# Patient Record
Sex: MALE | Race: OTHER | NOT HISPANIC OR LATINO | URBAN - METROPOLITAN AREA
[De-identification: names, ages, dates, MRNs, and addresses within clinical notes are randomized per-mention and may not be internally consistent; named-entity substitution may affect disease eponyms.]

---

## 2020-10-20 RX ORDER — AZTREONAM 2 G
1 VIAL (EA) INJECTION
Qty: 0 | Refills: 0 | DISCHARGE
Start: 2020-10-20 | End: 2020-10-27

## 2020-10-20 RX ORDER — PHENAZOPYRIDINE HCL 100 MG
1 TABLET ORAL
Qty: 0 | Refills: 0 | DISCHARGE
Start: 2020-10-20 | End: 2020-10-24

## 2020-10-23 ENCOUNTER — INPATIENT (INPATIENT)
Facility: HOSPITAL | Age: 45
LOS: 3 days | Discharge: HOME HEALTH | DRG: 862 | End: 2020-10-27
Attending: INTERNAL MEDICINE | Admitting: INTERNAL MEDICINE
Payer: COMMERCIAL

## 2020-10-23 VITALS
DIASTOLIC BLOOD PRESSURE: 67 MMHG | SYSTOLIC BLOOD PRESSURE: 103 MMHG | HEART RATE: 120 BPM | RESPIRATION RATE: 20 BRPM | WEIGHT: 175.05 LBS | HEIGHT: 67 IN | TEMPERATURE: 101 F

## 2020-10-23 DIAGNOSIS — A41.9 SEPSIS, UNSPECIFIED ORGANISM: ICD-10-CM

## 2020-10-23 LAB
ALBUMIN SERPL ELPH-MCNC: 4.1 G/DL — SIGNIFICANT CHANGE UP (ref 3.3–5)
ALP SERPL-CCNC: 50 U/L — SIGNIFICANT CHANGE UP (ref 40–120)
ALT FLD-CCNC: 26 U/L — SIGNIFICANT CHANGE UP (ref 10–45)
ANION GAP SERPL CALC-SCNC: 12 MMOL/L — SIGNIFICANT CHANGE UP (ref 5–17)
APPEARANCE UR: CLEAR — SIGNIFICANT CHANGE UP
APTT BLD: 36.2 SEC — HIGH (ref 27.5–35.5)
AST SERPL-CCNC: 21 U/L — SIGNIFICANT CHANGE UP (ref 10–40)
BASOPHILS # BLD AUTO: 0.05 K/UL — SIGNIFICANT CHANGE UP (ref 0–0.2)
BASOPHILS NFR BLD AUTO: 0.7 % — SIGNIFICANT CHANGE UP (ref 0–2)
BILIRUB SERPL-MCNC: 0.4 MG/DL — SIGNIFICANT CHANGE UP (ref 0.2–1.2)
BILIRUB UR-MCNC: ABNORMAL
BUN SERPL-MCNC: 16 MG/DL — SIGNIFICANT CHANGE UP (ref 7–23)
CALCIUM SERPL-MCNC: 9 MG/DL — SIGNIFICANT CHANGE UP (ref 8.4–10.5)
CHLORIDE SERPL-SCNC: 100 MMOL/L — SIGNIFICANT CHANGE UP (ref 96–108)
CO2 SERPL-SCNC: 22 MMOL/L — SIGNIFICANT CHANGE UP (ref 22–31)
COLOR SPEC: ABNORMAL
CREAT SERPL-MCNC: 1.14 MG/DL — SIGNIFICANT CHANGE UP (ref 0.5–1.3)
DIFF PNL FLD: NEGATIVE — SIGNIFICANT CHANGE UP
EOSINOPHIL # BLD AUTO: 0.06 K/UL — SIGNIFICANT CHANGE UP (ref 0–0.5)
EOSINOPHIL NFR BLD AUTO: 0.9 % — SIGNIFICANT CHANGE UP (ref 0–6)
GAS PNL BLDV: SIGNIFICANT CHANGE UP
GLUCOSE SERPL-MCNC: 106 MG/DL — HIGH (ref 70–99)
GLUCOSE UR QL: NEGATIVE — SIGNIFICANT CHANGE UP
HCT VFR BLD CALC: 44.8 % — SIGNIFICANT CHANGE UP (ref 39–50)
HGB BLD-MCNC: 14.7 G/DL — SIGNIFICANT CHANGE UP (ref 13–17)
IMM GRANULOCYTES NFR BLD AUTO: 1.3 % — SIGNIFICANT CHANGE UP (ref 0–1.5)
INR BLD: 1.33 RATIO — HIGH (ref 0.88–1.16)
KETONES UR-MCNC: NEGATIVE — SIGNIFICANT CHANGE UP
LEUKOCYTE ESTERASE UR-ACNC: NEGATIVE — SIGNIFICANT CHANGE UP
LYMPHOCYTES # BLD AUTO: 1.55 K/UL — SIGNIFICANT CHANGE UP (ref 1–3.3)
LYMPHOCYTES # BLD AUTO: 23.2 % — SIGNIFICANT CHANGE UP (ref 13–44)
MCHC RBC-ENTMCNC: 27.4 PG — SIGNIFICANT CHANGE UP (ref 27–34)
MCHC RBC-ENTMCNC: 32.8 GM/DL — SIGNIFICANT CHANGE UP (ref 32–36)
MCV RBC AUTO: 83.4 FL — SIGNIFICANT CHANGE UP (ref 80–100)
MONOCYTES # BLD AUTO: 0.45 K/UL — SIGNIFICANT CHANGE UP (ref 0–0.9)
MONOCYTES NFR BLD AUTO: 6.7 % — SIGNIFICANT CHANGE UP (ref 2–14)
NEUTROPHILS # BLD AUTO: 4.47 K/UL — SIGNIFICANT CHANGE UP (ref 1.8–7.4)
NEUTROPHILS NFR BLD AUTO: 67.2 % — SIGNIFICANT CHANGE UP (ref 43–77)
NITRITE UR-MCNC: POSITIVE
NRBC # BLD: 0 /100 WBCS — SIGNIFICANT CHANGE UP (ref 0–0)
PH UR: 6.5 — SIGNIFICANT CHANGE UP (ref 5–8)
PLATELET # BLD AUTO: 216 K/UL — SIGNIFICANT CHANGE UP (ref 150–400)
POTASSIUM SERPL-MCNC: 4.3 MMOL/L — SIGNIFICANT CHANGE UP (ref 3.5–5.3)
POTASSIUM SERPL-SCNC: 4.3 MMOL/L — SIGNIFICANT CHANGE UP (ref 3.5–5.3)
PROT SERPL-MCNC: 7.6 G/DL — SIGNIFICANT CHANGE UP (ref 6–8.3)
PROT UR-MCNC: NEGATIVE — SIGNIFICANT CHANGE UP
PROTHROM AB SERPL-ACNC: 15.7 SEC — HIGH (ref 10.6–13.6)
RAPID RVP RESULT: SIGNIFICANT CHANGE UP
RBC # BLD: 5.37 M/UL — SIGNIFICANT CHANGE UP (ref 4.2–5.8)
RBC # FLD: 12.6 % — SIGNIFICANT CHANGE UP (ref 10.3–14.5)
SARS-COV-2 RNA SPEC QL NAA+PROBE: SIGNIFICANT CHANGE UP
SODIUM SERPL-SCNC: 134 MMOL/L — LOW (ref 135–145)
SP GR SPEC: 1.01 — SIGNIFICANT CHANGE UP (ref 1.01–1.02)
UROBILINOGEN FLD QL: NEGATIVE — SIGNIFICANT CHANGE UP
WBC # BLD: 6.67 K/UL — SIGNIFICANT CHANGE UP (ref 3.8–10.5)
WBC # FLD AUTO: 6.67 K/UL — SIGNIFICANT CHANGE UP (ref 3.8–10.5)

## 2020-10-23 PROCEDURE — 93010 ELECTROCARDIOGRAM REPORT: CPT

## 2020-10-23 PROCEDURE — 74018 RADEX ABDOMEN 1 VIEW: CPT | Mod: 26

## 2020-10-23 PROCEDURE — 99291 CRITICAL CARE FIRST HOUR: CPT

## 2020-10-23 PROCEDURE — 71045 X-RAY EXAM CHEST 1 VIEW: CPT | Mod: 26

## 2020-10-23 RX ORDER — TAMSULOSIN HYDROCHLORIDE 0.4 MG/1
0.4 CAPSULE ORAL AT BEDTIME
Refills: 0 | Status: DISCONTINUED | OUTPATIENT
Start: 2020-10-23 | End: 2020-10-27

## 2020-10-23 RX ORDER — ALBUTEROL 90 UG/1
1 AEROSOL, METERED ORAL EVERY 6 HOURS
Refills: 0 | Status: DISCONTINUED | OUTPATIENT
Start: 2020-10-23 | End: 2020-10-27

## 2020-10-23 RX ORDER — PHENAZOPYRIDINE HCL 100 MG
200 TABLET ORAL THREE TIMES A DAY
Refills: 0 | Status: COMPLETED | OUTPATIENT
Start: 2020-10-23 | End: 2020-10-25

## 2020-10-23 RX ORDER — CEFTRIAXONE 500 MG/1
1000 INJECTION, POWDER, FOR SOLUTION INTRAMUSCULAR; INTRAVENOUS ONCE
Refills: 0 | Status: COMPLETED | OUTPATIENT
Start: 2020-10-23 | End: 2020-10-23

## 2020-10-23 RX ORDER — VANCOMYCIN HCL 1 G
1000 VIAL (EA) INTRAVENOUS ONCE
Refills: 0 | Status: COMPLETED | OUTPATIENT
Start: 2020-10-23 | End: 2020-10-23

## 2020-10-23 RX ORDER — SODIUM CHLORIDE 9 MG/ML
1000 INJECTION INTRAMUSCULAR; INTRAVENOUS; SUBCUTANEOUS ONCE
Refills: 0 | Status: COMPLETED | OUTPATIENT
Start: 2020-10-23 | End: 2020-10-23

## 2020-10-23 RX ORDER — ACETAMINOPHEN 500 MG
650 TABLET ORAL ONCE
Refills: 0 | Status: COMPLETED | OUTPATIENT
Start: 2020-10-23 | End: 2020-10-23

## 2020-10-23 RX ORDER — INFLUENZA VIRUS VACCINE 15; 15; 15; 15 UG/.5ML; UG/.5ML; UG/.5ML; UG/.5ML
0.5 SUSPENSION INTRAMUSCULAR ONCE
Refills: 0 | Status: COMPLETED | OUTPATIENT
Start: 2020-10-23 | End: 2020-10-27

## 2020-10-23 RX ORDER — SODIUM CHLORIDE 9 MG/ML
2500 INJECTION INTRAMUSCULAR; INTRAVENOUS; SUBCUTANEOUS ONCE
Refills: 0 | Status: DISCONTINUED | OUTPATIENT
Start: 2020-10-23 | End: 2020-10-23

## 2020-10-23 RX ORDER — CEFTRIAXONE 500 MG/1
1000 INJECTION, POWDER, FOR SOLUTION INTRAMUSCULAR; INTRAVENOUS EVERY 24 HOURS
Refills: 0 | Status: DISCONTINUED | OUTPATIENT
Start: 2020-10-24 | End: 2020-10-24

## 2020-10-23 RX ORDER — CEFTRIAXONE 500 MG/1
INJECTION, POWDER, FOR SOLUTION INTRAMUSCULAR; INTRAVENOUS
Refills: 0 | Status: DISCONTINUED | OUTPATIENT
Start: 2020-10-23 | End: 2020-10-24

## 2020-10-23 RX ORDER — SODIUM CHLORIDE 9 MG/ML
1000 INJECTION, SOLUTION INTRAVENOUS ONCE
Refills: 0 | Status: COMPLETED | OUTPATIENT
Start: 2020-10-23 | End: 2020-10-23

## 2020-10-23 RX ORDER — PIPERACILLIN AND TAZOBACTAM 4; .5 G/20ML; G/20ML
3.38 INJECTION, POWDER, LYOPHILIZED, FOR SOLUTION INTRAVENOUS ONCE
Refills: 0 | Status: COMPLETED | OUTPATIENT
Start: 2020-10-23 | End: 2020-10-23

## 2020-10-23 RX ADMIN — CEFTRIAXONE 100 MILLIGRAM(S): 500 INJECTION, POWDER, FOR SOLUTION INTRAMUSCULAR; INTRAVENOUS at 19:16

## 2020-10-23 RX ADMIN — SODIUM CHLORIDE 1000 MILLILITER(S): 9 INJECTION INTRAMUSCULAR; INTRAVENOUS; SUBCUTANEOUS at 19:00

## 2020-10-23 RX ADMIN — TAMSULOSIN HYDROCHLORIDE 0.4 MILLIGRAM(S): 0.4 CAPSULE ORAL at 23:17

## 2020-10-23 RX ADMIN — SODIUM CHLORIDE 1000 MILLILITER(S): 9 INJECTION, SOLUTION INTRAVENOUS at 17:32

## 2020-10-23 RX ADMIN — Medication 200 MILLIGRAM(S): at 23:17

## 2020-10-23 RX ADMIN — Medication 650 MILLIGRAM(S): at 19:00

## 2020-10-23 RX ADMIN — PIPERACILLIN AND TAZOBACTAM 200 GRAM(S): 4; .5 INJECTION, POWDER, LYOPHILIZED, FOR SOLUTION INTRAVENOUS at 17:23

## 2020-10-23 RX ADMIN — Medication 650 MILLIGRAM(S): at 17:22

## 2020-10-23 RX ADMIN — Medication 250 MILLIGRAM(S): at 18:01

## 2020-10-23 NOTE — ED PROVIDER NOTE - PHYSICAL EXAMINATION
Physical Examination: Gen: NAD, non-toxic, conversational  Eyes: PERRLA, EOMI   HENT: Normocephalic, atraumatic. External ears normal, no rhinorrhea, moist mucous membranes.   CV: regular rhythm tachycardia. no M/R/G  Resp: CTAB, non-labored, speaking without difficulty on room air  Abd: soft, non-tender, non rigid, no guarding or rebound tenderness  Back: No CVAT bilaterally, no midline ttp  Skin: dry, wwp   Neuro: AOx3, speech is fluent and appropriate, MORRISSEY without laterality, stable gait   Psych: Mood okay, affect euthymic Physical Examination: Gen: NAD, non-toxic, conversational  Eyes: PERRLA, EOMI   HENT: Normocephalic, atraumatic. External ears normal, no rhinorrhea, moist mucous membranes.   CV: regular rhythm tachycardia. no M/R/G  Resp: CTAB, non-labored, speaking without difficulty on room air  Abd: soft, non-tender, non rigid, no guarding or rebound tenderness  gu(chaperone dr vick) : no testicular edema or erythema, no drainage, no inguinal mass   Back: No CVAT bilaterally, no midline ttp  Skin: dry, wwp   Neuro: AOx3, speech is fluent and appropriate, MORRISSEY without laterality, stable gait   Psych: Mood okay, affect euthymic

## 2020-10-23 NOTE — ED PROVIDER NOTE - OBJECTIVE STATEMENT
45M hx urinary retention presents with Fever  x  3  days,  s/p  F/C  insertion,  negative  COVID, patient  is  on  bactrim post cystoscopy. intermittently had hematuria post procedure. Patient denies chest pain, SOB, cough, abd pain, N/V/D/C, weakness, HA.     [facility] urologist 45M hx urinary retention presents with Fever  x  3  days,  s/p  F/C  insertion,  negative  COVID, patient  is  on  bactrim post cystoscopy. intermittently had hematuria post procedure. Patient denies chest pain, SOB, cough, abd pain, N/V/D/C, weakness, HA.     Spoke with doc on call for [facility] urologist: They did urocuff procedure Aug 2020 to look at his prostate size which was enlarged so on 10/19/20 they did cystoscopy + US via rectum to ddx urinary retention and enlarged prostate. since then pt has been spiking fevers so started on bactrim. UCx has yet to speciate organism.

## 2020-10-23 NOTE — ED ADULT NURSE NOTE - NSIMPLEMENTINTERV_GEN_ALL_ED
Implemented All Universal Safety Interventions:  [place] to call system. Call bell, personal items and telephone within reach. Instruct patient to call for assistance. Room bathroom lighting operational. Non-slip footwear when patient is off stretcher. Physically safe environment: no spills, clutter or unnecessary equipment. Stretcher in lowest position, wheels locked, appropriate side rails in place.

## 2020-10-23 NOTE — ED PROVIDER NOTE - CLINICAL SUMMARY MEDICAL DECISION MAKING FREE TEXT BOX
45M hx urinary retention presents with Fever  x  3  days,  s/p  F/C  insertion,  negative  COVID, patient  is  on  bactrim post cystoscopy. intermittently had hematuria post procedure. likely UroSepsis-IV heplock and flush as per protocol, Cardiac monitor, Pulse Oximetry, Labs, VBG, BCx, UA, UCx, IVF, antibiotics, analgesia as needed, antipyretics as needed, Reassess and consider repeat labs as needed for lactate trending. concern for renal colic as well. consider renal US 45M hx urinary retention presents with Fever  x  3  days,  s/p  F/C  insertion,  negative  COVID, patient  is  on  bactrim post cystoscopy. intermittently had hematuria post procedure. likely UroSepsis-IV heplock and flush as per protocol, Cardiac monitor, Pulse Oximetry, Labs, VBG, BCx, UA, UCx, IVF, antibiotics, analgesia as needed, antipyretics as needed, Reassess and consider repeat labs as needed for lactate trending. concern for renal colic as well. consider KUB to look for possible stent placed, call urologist

## 2020-10-23 NOTE — ED ADULT NURSE REASSESSMENT NOTE - NS ED NURSE REASSESS COMMENT FT1
Pt received from [facility] in [place]. Pt remains in the ED. Resting comfortably in bed. NAD. AOx4. Breathing unlabored and spontaneously, sating 95% on room air. S1 and S2 heard. No peripheral edema. Peripheral pulses strong bilaterally. On cardiac monitor, Sinus tachycardia. Abdomen soft, nontender and nondistended. Positive bowel sounds in all 4 quadrants. post void bladder scan shows 729mL in bladder. MD Soliz notified. Procedure explained to pt in sign language by family. Fuller placed, 16FR coude. Sterile technique maintained. 2 RN present at the bedside. Draining clear orange tinged urine. Tolerated well. Pt safety maintained. Awaiting bed. Will continue to monitor.

## 2020-10-23 NOTE — ED PROVIDER NOTE - NS ED ROS FT
Constitutional: +fevers, +chills.  Eyes: no visual changes.  Ears: no ear drainage, no ear pain.  Nose: no nasal congestion.  Mouth/Throat: no sore throat.  Cardiovascular: no chest pain.  Respiratory: no shortness of breath, no wheezing, no cough  Gastrointestinal: no nausea, no vomiting, no diarrhea, no abdominal pain.  MSK: +R flank pain, no back pain.  Genitourinary: no dysuria, +hematuria.  Skin: no rashes.  Neuro: no headache

## 2020-10-23 NOTE — ED ADULT NURSE NOTE - OBJECTIVE STATEMENT
46 y/o male no PMHx arrives to [facility] ED by car from urgent care with c/o fever. Patient's wife at bedside, patient's . Pt wife states Monday went for a "test through penis". On tuesday started on 2 unknown antibiotics due to fever with Tmax 101.7. Went to urologist today, then urgent care where Tmax 102.9, , BP 87/59. Patient feeling anxious, and experiences right sided flank pain but only in the mornings. Patient is A&Ox4, reddened sclera. Respirations spontaneous and unlabored. Denies SOB, dyspnea, cough, chest pain, palpitations. EKG done, on CM, sinus tachy. No abdominal pain, soft NT/ND. No n/v/d. States urine retention, red/orange but unknown if due to medications. Denies fever/chills. No sick contacts. Skin is warm/dry and normal for race.

## 2020-10-23 NOTE — H&P ADULT - ASSESSMENT
The patient is a 45y Male complaining of fever.    UTI:  IV Ceftriaxone  ID eval called.  F/up with [facility]  Cont pyridium    BPH:  On flomax    Br Asthma:  Albuterol

## 2020-10-23 NOTE — ED PROVIDER NOTE - ATTENDING CONTRIBUTION TO CARE
45yr M hx of urinary retention with recent cystoscopy which per report showed some kind of obstruction and had diffiulty urianting. had recurrent fevers, had one dose of IV AB and was on bactrim and flomax. no cp, sob, abd pain, vomiting, but had poor PO  appears calm comfortable, tachy and febrile but not dyspneic or hypotensive. clear lungs, s1-2, soft abd non distended, no CVAT, testicular exam unremarkable.  concern for UTI vs pyelo. will check labs, xrays and start empiric IV AB. antipyretics and IV hydration. will be admitted if no emergent urologic procedure is not planned.

## 2020-10-23 NOTE — H&P ADULT - HISTORY OF PRESENT ILLNESS
45M hx urinary retention presents with Fever  x  3  days,  s/p  F/C  insertion,  negative  COVID, patient  is  on  bactrim post cystoscopy. intermittently had hematuria post procedure. Patient denies chest pain, SOB, cough, abd pain, N/V/D/C, weakness, HA.   As per out pt urologist: They did urocuff procedure in Aug 2020 to look at his prostate size which was enlarged so on 10/19/20 - cystoscopy + US via rectum to ddx urinary retention and enlarged prostate. since then pt has been spiking temp.  started on bactrim. Came to ER today for fever and hematuria

## 2020-10-24 ENCOUNTER — TRANSCRIPTION ENCOUNTER (OUTPATIENT)
Age: 45
End: 2020-10-24

## 2020-10-24 LAB
ANION GAP SERPL CALC-SCNC: 11 MMOL/L — SIGNIFICANT CHANGE UP (ref 5–17)
BUN SERPL-MCNC: 12 MG/DL — SIGNIFICANT CHANGE UP (ref 7–23)
CALCIUM SERPL-MCNC: 8.4 MG/DL — SIGNIFICANT CHANGE UP (ref 8.4–10.5)
CHLORIDE SERPL-SCNC: 103 MMOL/L — SIGNIFICANT CHANGE UP (ref 96–108)
CO2 SERPL-SCNC: 21 MMOL/L — LOW (ref 22–31)
CREAT SERPL-MCNC: 0.82 MG/DL — SIGNIFICANT CHANGE UP (ref 0.5–1.3)
CULTURE RESULTS: NO GROWTH — SIGNIFICANT CHANGE UP
GLUCOSE SERPL-MCNC: 89 MG/DL — SIGNIFICANT CHANGE UP (ref 70–99)
HCT VFR BLD CALC: 40.4 % — SIGNIFICANT CHANGE UP (ref 39–50)
HGB BLD-MCNC: 13.5 G/DL — SIGNIFICANT CHANGE UP (ref 13–17)
MCHC RBC-ENTMCNC: 27.6 PG — SIGNIFICANT CHANGE UP (ref 27–34)
MCHC RBC-ENTMCNC: 33.4 GM/DL — SIGNIFICANT CHANGE UP (ref 32–36)
MCV RBC AUTO: 82.6 FL — SIGNIFICANT CHANGE UP (ref 80–100)
NRBC # BLD: 0 /100 WBCS — SIGNIFICANT CHANGE UP (ref 0–0)
PLATELET # BLD AUTO: 198 K/UL — SIGNIFICANT CHANGE UP (ref 150–400)
POTASSIUM SERPL-MCNC: 3.7 MMOL/L — SIGNIFICANT CHANGE UP (ref 3.5–5.3)
POTASSIUM SERPL-SCNC: 3.7 MMOL/L — SIGNIFICANT CHANGE UP (ref 3.5–5.3)
RBC # BLD: 4.89 M/UL — SIGNIFICANT CHANGE UP (ref 4.2–5.8)
RBC # FLD: 12.8 % — SIGNIFICANT CHANGE UP (ref 10.3–14.5)
SODIUM SERPL-SCNC: 135 MMOL/L — SIGNIFICANT CHANGE UP (ref 135–145)
SPECIMEN SOURCE: SIGNIFICANT CHANGE UP
WBC # BLD: 5.22 K/UL — SIGNIFICANT CHANGE UP (ref 3.8–10.5)
WBC # FLD AUTO: 5.22 K/UL — SIGNIFICANT CHANGE UP (ref 3.8–10.5)

## 2020-10-24 PROCEDURE — 74177 CT ABD & PELVIS W/CONTRAST: CPT | Mod: 26

## 2020-10-24 RX ORDER — ACETAMINOPHEN 500 MG
1000 TABLET ORAL ONCE
Refills: 0 | Status: COMPLETED | OUTPATIENT
Start: 2020-10-24 | End: 2020-10-24

## 2020-10-24 RX ORDER — ACETAMINOPHEN 500 MG
650 TABLET ORAL EVERY 6 HOURS
Refills: 0 | Status: DISCONTINUED | OUTPATIENT
Start: 2020-10-24 | End: 2020-10-27

## 2020-10-24 RX ORDER — MEROPENEM 1 G/30ML
2000 INJECTION INTRAVENOUS EVERY 8 HOURS
Refills: 0 | Status: DISCONTINUED | OUTPATIENT
Start: 2020-10-24 | End: 2020-10-26

## 2020-10-24 RX ADMIN — Medication 200 MILLIGRAM(S): at 05:07

## 2020-10-24 RX ADMIN — Medication 650 MILLIGRAM(S): at 23:37

## 2020-10-24 RX ADMIN — Medication 650 MILLIGRAM(S): at 12:57

## 2020-10-24 RX ADMIN — Medication 650 MILLIGRAM(S): at 22:33

## 2020-10-24 RX ADMIN — TAMSULOSIN HYDROCHLORIDE 0.4 MILLIGRAM(S): 0.4 CAPSULE ORAL at 21:10

## 2020-10-24 RX ADMIN — Medication 650 MILLIGRAM(S): at 12:27

## 2020-10-24 RX ADMIN — MEROPENEM 200 MILLIGRAM(S): 1 INJECTION INTRAVENOUS at 21:10

## 2020-10-24 RX ADMIN — Medication 1000 MILLIGRAM(S): at 02:19

## 2020-10-24 RX ADMIN — Medication 200 MILLIGRAM(S): at 13:41

## 2020-10-24 RX ADMIN — Medication 400 MILLIGRAM(S): at 00:59

## 2020-10-24 RX ADMIN — MEROPENEM 200 MILLIGRAM(S): 1 INJECTION INTRAVENOUS at 13:41

## 2020-10-24 RX ADMIN — Medication 200 MILLIGRAM(S): at 21:10

## 2020-10-24 NOTE — PROGRESS NOTE ADULT - ASSESSMENT
The patient is a 45y Male complaining of fever.    UTI:  IV Meropenem  ID eval appreciated  Cont pyridium  CT Abd/P    BPH:  On flomax    Br Asthma:  Albuterol    Dw Pt's wife

## 2020-10-24 NOTE — CONSULT NOTE ADULT - SUBJECTIVE AND OBJECTIVE BOX
Patient is a 45y old  Male who presents with a chief complaint of The patient is a 45y Male complaining of fever. (24 Oct 2020 10:23)      HPI:    45M hx urinary retention presents with Fever  x  3  days,  s/p  F/C  insertion,  negative  COVID, patient  is  on  bactrim post cystoscopy. intermittently had hematuria post procedure. Patient denies chest pain, SOB, cough, abd pain, N/V/D/C, weakness, HA.   As per out pt urologist: They did urocuff procedure in Aug 2020 to look at his prostate size which was enlarged so on 10/19/20 - cystoscopy + US via rectum to ddx urinary retention and enlarged prostate. since then pt has been spiking temp.  started on bactrim. Came to ER today for fever and hematuria  (23 Oct 2020 18:34)    ER vitals:  Tmax 102.1, P 122, BP 94/66.  No leukocytosis.  UA (+) nitrites.  RVP and SARS-Cov-2 (-).  Cxr clear lungs, KUB xray (-) for foreign body correlating to stent.   Pt given vanco/zosyn in ER, now on rocephin.  ID consulted for further abx managment.       REVIEW OF SYSTEMS:    CONSTITUTIONAL: No fever, weight loss, or fatigue  EYES: No eye pain, visual disturbances, or discharge  ENMT:  No sore throat  NECK: No pain or stiffness  RESPIRATORY: No cough, wheezing, chills or hemoptysis; No shortness of breath  CARDIOVASCULAR: No chest pain, palpitations, dizziness, or leg swelling  GASTROINTESTINAL: No abdominal or epigastric pain. No nausea, vomiting, or hematemesis; No diarrhea or constipation. No melena or hematochezia.  GENITOURINARY: No dysuria, frequency, hematuria, or incontinence  NEUROLOGICAL: No headaches, memory loss, loss of strength, numbness, or tremors  SKIN: No itching, burning, rashes, or lesions   LYMPH NODES: No enlarged glands  MUSCULOSKELETAL: No joint pain or swelling; No muscle, back, or extremity pain      PAST MEDICAL & SURGICAL HISTORY:  No pertinent past medical history    No significant past surgical history        Allergies    No Known Allergies    Intolerances        FAMILY HISTORY:    No pertinent fam hx in 1st degree relatives    SOCIAL HISTORY:        MEDICATIONS  (STANDING):  cefTRIAXone   IVPB      cefTRIAXone   IVPB 1000 milliGRAM(s) IV Intermittent every 24 hours  influenza   Vaccine 0.5 milliLiter(s) IntraMuscular once  phenazopyridine 200 milliGRAM(s) Oral three times a day  tamsulosin 0.4 milliGRAM(s) Oral at bedtime    MEDICATIONS  (PRN):  acetaminophen   Tablet .. 650 milliGRAM(s) Oral every 6 hours PRN Temp greater or equal to 38C (100.4F)  ALBUTerol    90 MICROgram(s) HFA Inhaler 1 Puff(s) Inhalation every 6 hours PRN Shortness of Breath and/or Wheezing      Vital Signs Last 24 Hrs  T(C): 39.3 (24 Oct 2020 11:54), Max: 39.3 (24 Oct 2020 11:54)  T(F): 102.7 (24 Oct 2020 11:54), Max: 102.7 (24 Oct 2020 11:54)  HR: 108 (24 Oct 2020 11:30) (90 - 122)  BP: 104/71 (24 Oct 2020 11:30) (92/70 - 112/73)  BP(mean): 75 (23 Oct 2020 19:15) (75 - 75)  RR: 17 (24 Oct 2020 11:30) (16 - 22)  SpO2: 95% (24 Oct 2020 11:30) (92% - 98%)    PHYSICAL EXAM:    GENERAL: NAD, well-groomed  HEAD:  Atraumatic, Normocephalic  EYES: EOMI, PERRLA, conjunctiva and sclera clear  ENMT: No tonsillar erythema, exudates, or enlargement; Moist mucous membranes  NECK: Supple, No JVD  CHEST/LUNG: Clear to percussion bilaterally; No rales, rhonchi, wheezing, or rubs  HEART: Regular rate and rhythm; No murmurs, rubs, or gallops  ABDOMEN: Soft, Nontender, Nondistended; Bowel sounds present  EXTREMITIES:  2+ Peripheral Pulses, No clubbing, cyanosis, or edema  LYMPH: No lymphadenopathy noted  SKIN: No rashes or lesions    LABS:  CBC Full  -  ( 24 Oct 2020 06:34 )  WBC Count : 5.22 K/uL  RBC Count : 4.89 M/uL  Hemoglobin : 13.5 g/dL  Hematocrit : 40.4 %  Platelet Count - Automated : 198 K/uL  Mean Cell Volume : 82.6 fl  Mean Cell Hemoglobin : 27.6 pg  Mean Cell Hemoglobin Concentration : 33.4 gm/dL  Auto Neutrophil # : x  Auto Lymphocyte # : x  Auto Monocyte # : x  Auto Eosinophil # : x  Auto Basophil # : x  Auto Neutrophil % : x  Auto Lymphocyte % : x  Auto Monocyte % : x  Auto Eosinophil % : x  Auto Basophil % : x      10-24    135  |  103  |  12  ----------------------------<  89  3.7   |  21<L>  |  0.82  Rapid RVP Result: NotDetec (10.23.20 @ 17:48)   Ca    8.4      24 Oct 2020 06:34    TPro  7.6  /  Alb  4.1  /  TBili  0.4  /  DBili  x   /  AST  21  /  ALT  26  /  AlkPhos  50  10-      LIVER FUNCTIONS - ( 23 Oct 2020 17:15 )  Alb: 4.1 g/dL / Pro: 7.6 g/dL / ALK PHOS: 50 U/L / ALT: 26 U/L / AST: 21 U/L / GGT: x                               MICROBIOLOGY:        Urinalysis Basic - ( 23 Oct 2020 20:17 )    Color: Dark Yellow / Appearance: Clear / S.011 / pH: x  Gluc: x / Ketone: Negative  / Bili: Small / Urobili: Negative   Blood: x / Protein: Negative / Nitrite: Positive   Leuk Esterase: Negative / RBC: 7 /hpf / WBC 1 /HPF   Sq Epi: x / Non Sq Epi: 0 /hpf / Bacteria: Few        SARS-CoV-2: NotDetec: This Respiratory Panel uses polymerase chain reaction (PCR) to detect for   adenovirus; coronavirus (HKU1, NL63, 229E, OC43); human metapneumovirus   (hMPV); human enterovirus/rhinovirus (Entero/RV); influenza A; influenza   A/H1; influenza A/H3; influenza A/H1-2009; influenza B; parainfluenza   viruses 1, 2, 3, 4; respiratory syncytial virus; Mycoplasma pneumoniae;   Chlamydophila pneumoniae; and SARS-CoV-2. (10.23.20 @ 17:48)       Rapid RVP Result: NotDetec (10.23.20 @ 17:48)       RADIOLOGY:    < from: Xray Kidney Ureter Bladder (10.23.20 @ 17:00) >    EXAM:  XR KUB 1 VIEW                            PROCEDURE DATE:  10/23/2020            INTERPRETATION:  CLINICAL INFORMATION: Post cystostomy. Assess for stent.    TECHNIQUE: Single frontal radiograph of the abdomen 10/23/2020.    COMPARISON: No similar prior comparisons available.    FINDINGS:  Multiple air-filled loops of bowel. Nonobstructive bowel gas pattern.  No evidence of intraperitoneal free air.  No acute osseous abnormality.  No radiopaque foreign body.    IMPRESSION: No radiopaque foreign body correlating to a stent.    < end of copied text >      < from: Xray Chest 1 View- PORTABLE-Urgent (10.23.20 @ 16:57) >    PROCEDURE DATE:  10/23/2020            INTERPRETATION:  CLINICAL INFORMATION: Sepsis.    TECHNIQUE: Single frontal radiograph of the chest 10/23/2020.    COMPARISON: No similar prior comparisons available.    FINDINGS:  The lungs are clear.  No pleural effusion. No pneumothorax. Elevated right hemidiaphragm.  Cardiac size is within normal limits.      IMPRESSION: Elevated right hemidiaphragm. Clear lungs.    < end of copied text >                   Patient is a 45y old  Male who presents with a chief complaint of The patient is a 45y Male complaining of fever. (24 Oct 2020 10:23)      HPI:    45M hx urinary retention presents with Fever  x  3  days,  s/p  F/C  insertion,  negative  COVID, patient  is  on  bactrim post cystoscopy. intermittently had hematuria post procedure. Patient denies chest pain, SOB, cough, abd pain, N/V/D/C, weakness, HA.   As per out pt urologist: They did urocuff procedure in Aug 2020 to look at his prostate size which was enlarged so on 10/19/20 - cystoscopy + US via rectum to ddx urinary retention and enlarged prostate. since then pt has been spiking temp.  started on bactrim. Came to ER today for fever and hematuria  (23 Oct 2020 18:34)    ER vitals:  Tmax 102.1, P 122, BP 94/66.  No leukocytosis.  UA (+) nitrites.  RVP and SARS-Cov-2 (-).  Cxr clear lungs, KUB xray (-) for foreign body correlating to stent.   Pt given vanco/zosyn in ER, now on rocephin.  ID consulted for further abx managment.      Pt's wife available for sign language translation.       REVIEW OF SYSTEMS:    CONSTITUTIONAL: No fever, weight loss, or fatigue  EYES: No eye pain, visual disturbances, or discharge  ENMT:  No sore throat  NECK: No pain or stiffness  RESPIRATORY: No cough, wheezing, chills or hemoptysis; No shortness of breath  CARDIOVASCULAR: No chest pain, palpitations, dizziness, or leg swelling  GASTROINTESTINAL: No abdominal or epigastric pain. No nausea, vomiting, or hematemesis; No diarrhea or constipation. No melena or hematochezia.  GENITOURINARY: No dysuria, frequency, hematuria, or incontinence  NEUROLOGICAL: No headaches, memory loss, loss of strength, numbness, or tremors  SKIN: No itching, burning, rashes, or lesions   LYMPH NODES: No enlarged glands  MUSCULOSKELETAL: No joint pain or swelling; No muscle, back, or extremity pain      PAST MEDICAL & SURGICAL HISTORY:  No pertinent past medical history    No significant past surgical history        Allergies    No Known Allergies    Intolerances        FAMILY HISTORY:    No pertinent fam hx in 1st degree relatives    SOCIAL HISTORY:  Pt , lives with wife.  Hearing impaired      MEDICATIONS  (STANDING):  cefTRIAXone   IVPB      cefTRIAXone   IVPB 1000 milliGRAM(s) IV Intermittent every 24 hours  influenza   Vaccine 0.5 milliLiter(s) IntraMuscular once  phenazopyridine 200 milliGRAM(s) Oral three times a day  tamsulosin 0.4 milliGRAM(s) Oral at bedtime    MEDICATIONS  (PRN):  acetaminophen   Tablet .. 650 milliGRAM(s) Oral every 6 hours PRN Temp greater or equal to 38C (100.4F)  ALBUTerol    90 MICROgram(s) HFA Inhaler 1 Puff(s) Inhalation every 6 hours PRN Shortness of Breath and/or Wheezing      Vital Signs Last 24 Hrs  T(C): 39.3 (24 Oct 2020 11:54), Max: 39.3 (24 Oct 2020 11:54)  T(F): 102.7 (24 Oct 2020 11:54), Max: 102.7 (24 Oct 2020 11:54)  HR: 108 (24 Oct 2020 11:30) (90 - 122)  BP: 104/71 (24 Oct 2020 11:30) (92/70 - 112/73)  BP(mean): 75 (23 Oct 2020 19:15) (75 - 75)  RR: 17 (24 Oct 2020 11:30) (16 - 22)  SpO2: 95% (24 Oct 2020 11:30) (92% - 98%)    PHYSICAL EXAM:    GENERAL: NAD, well-groomed  HEAD:  Atraumatic, Normocephalic  EYES: EOMI, PERRLA, conjunctiva and sclera clear  ENMT: No tonsillar erythema, exudates, or enlargement; Moist mucous membranes  NECK: Supple, No JVD  CHEST/LUNG: Clear to percussion bilaterally; No rales, rhonchi, wheezing, or rubs  HEART: Regular rate and rhythm; No murmurs, rubs, or gallops  ABDOMEN: Soft, Nontender, Nondistended; Bowel sounds present  EXTREMITIES:  2+ Peripheral Pulses, No clubbing, cyanosis, or edema  LYMPH: No lymphadenopathy noted  SKIN: No rashes or lesions  :  russo draining dark yellow urine    LABS:  CBC Full  -  ( 24 Oct 2020 06:34 )  WBC Count : 5.22 K/uL  RBC Count : 4.89 M/uL  Hemoglobin : 13.5 g/dL  Hematocrit : 40.4 %  Platelet Count - Automated : 198 K/uL  Mean Cell Volume : 82.6 fl  Mean Cell Hemoglobin : 27.6 pg  Mean Cell Hemoglobin Concentration : 33.4 gm/dL  Auto Neutrophil # : x  Auto Lymphocyte # : x  Auto Monocyte # : x  Auto Eosinophil # : x  Auto Basophil # : x  Auto Neutrophil % : x  Auto Lymphocyte % : x  Auto Monocyte % : x  Auto Eosinophil % : x  Auto Basophil % : x      10-24    135  |  103  |  12  ----------------------------<  89  3.7   |  21<L>  |  0.82  Rapid RVP Result: NotDetec (10.23.20 @ 17:48)   Ca    8.4      24 Oct 2020 06:34    TPro  7.6  /  Alb  4.1  /  TBili  0.4  /  DBili  x   /  AST  21  /  ALT  26  /  AlkPhos  50  10-23      LIVER FUNCTIONS - ( 23 Oct 2020 17:15 )  Alb: 4.1 g/dL / Pro: 7.6 g/dL / ALK PHOS: 50 U/L / ALT: 26 U/L / AST: 21 U/L / GGT: x                               MICROBIOLOGY:        Urinalysis Basic - ( 23 Oct 2020 20:17 )    Color: Dark Yellow / Appearance: Clear / S.011 / pH: x  Gluc: x / Ketone: Negative  / Bili: Small / Urobili: Negative   Blood: x / Protein: Negative / Nitrite: Positive   Leuk Esterase: Negative / RBC: 7 /hpf / WBC 1 /HPF   Sq Epi: x / Non Sq Epi: 0 /hpf / Bacteria: Few        SARS-CoV-2: NotDetec: This Respiratory Panel uses polymerase chain reaction (PCR) to detect for   adenovirus; coronavirus (HKU1, NL63, 229E, OC43); human metapneumovirus   (hMPV); human enterovirus/rhinovirus (Entero/RV); influenza A; influenza   A/H1; influenza A/H3; influenza A/H1-2009; influenza B; parainfluenza   viruses 1, 2, 3, 4; respiratory syncytial virus; Mycoplasma pneumoniae;   Chlamydophila pneumoniae; and SARS-CoV-2. (10.23.20 @ 17:48)       Rapid RVP Result: NotDetec (10.23.20 @ 17:48)       RADIOLOGY:    < from: Xray Kidney Ureter Bladder (10.23.20 @ 17:00) >    EXAM:  XR KUB 1 VIEW                            PROCEDURE DATE:  10/23/2020            INTERPRETATION:  CLINICAL INFORMATION: Post cystostomy. Assess for stent.    TECHNIQUE: Single frontal radiograph of the abdomen 10/23/2020.    COMPARISON: No similar prior comparisons available.    FINDINGS:  Multiple air-filled loops of bowel. Nonobstructive bowel gas pattern.  No evidence of intraperitoneal free air.  No acute osseous abnormality.  No radiopaque foreign body.    IMPRESSION: No radiopaque foreign body correlating to a stent.    < end of copied text >      < from: Xray Chest 1 View- PORTABLE-Urgent (10.23.20 @ 16:57) >    PROCEDURE DATE:  10/23/2020            INTERPRETATION:  CLINICAL INFORMATION: Sepsis.    TECHNIQUE: Single frontal radiograph of the chest 10/23/2020.    COMPARISON: No similar prior comparisons available.    FINDINGS:  The lungs are clear.  No pleural effusion. No pneumothorax. Elevated right hemidiaphragm.  Cardiac size is within normal limits.      IMPRESSION: Elevated right hemidiaphragm. Clear lungs.    < end of copied text >

## 2020-10-24 NOTE — PROGRESS NOTE ADULT - SUBJECTIVE AND OBJECTIVE BOX
Patient is a 45y old  Male who presents with a chief complaint of The patient is a 45y Male complaining of fever. (24 Oct 2020 10:23)      SUBJECTIVE / OVERNIGHT EVENTS:    Events noted. Deaf.  CONSTITUTIONAL: No fever,  or fatigue  RESPIRATORY: No cough, wheezing,  No shortness of breath  CARDIOVASCULAR: No chest pain, palpitations,   GASTROINTESTINAL: No abdominal or epigastric pain.   NEUROLOGICAL: No headaches,     MEDICATIONS  (STANDING):  influenza   Vaccine 0.5 milliLiter(s) IntraMuscular once  meropenem  IVPB 2000 milliGRAM(s) IV Intermittent every 8 hours  phenazopyridine 200 milliGRAM(s) Oral three times a day  tamsulosin 0.4 milliGRAM(s) Oral at bedtime    MEDICATIONS  (PRN):  acetaminophen   Tablet .. 650 milliGRAM(s) Oral every 6 hours PRN Temp greater or equal to 38C (100.4F)  ALBUTerol    90 MICROgram(s) HFA Inhaler 1 Puff(s) Inhalation every 6 hours PRN Shortness of Breath and/or Wheezing        CAPILLARY BLOOD GLUCOSE        I&O's Summary    23 Oct 2020 07:  -  24 Oct 2020 07:00  --------------------------------------------------------  IN: 120 mL / OUT: 1900 mL / NET: -1780 mL    24 Oct 2020 07:01  -  24 Oct 2020 18:09  --------------------------------------------------------  IN: 0 mL / OUT: 1100 mL / NET: -1100 mL        PHYSICAL EXAM:    NECK: Supple, No JVD  CHEST/LUNG: Clear to auscultation bilaterally; No wheezing.  HEART: Regular rate and rhythm; No murmurs, rubs, or gallops  ABDOMEN: Soft, Nontender, Nondistended; Bowel sounds present  EXTREMITIES:   No edema  NEUROLOGY: AAO       LABS:                        13.5   5.22  )-----------( 198      ( 24 Oct 2020 06:34 )             40.4     10-24    135  |  103  |  12  ----------------------------<  89  3.7   |  21<L>  |  0.82    Ca    8.4      24 Oct 2020 06:34    TPro  7.6  /  Alb  4.1  /  TBili  0.4  /  DBili  x   /  AST  21  /  ALT  26  /  AlkPhos  50  10-23    PT/INR - ( 23 Oct 2020 17:15 )   PT: 15.7 sec;   INR: 1.33 ratio         PTT - ( 23 Oct 2020 17:15 )  PTT:36.2 sec      Urinalysis Basic - ( 23 Oct 2020 20:17 )    Color: Dark Yellow / Appearance: Clear / S.011 / pH: x  Gluc: x / Ketone: Negative  / Bili: Small / Urobili: Negative   Blood: x / Protein: Negative / Nitrite: Positive   Leuk Esterase: Negative / RBC: 7 /hpf / WBC 1 /HPF   Sq Epi: x / Non Sq Epi: 0 /hpf / Bacteria: Few      CAPILLARY BLOOD GLUCOSE                    RADIOLOGY & ADDITIONAL TESTS:    Imaging Personally Reviewed:    Consultant(s) Notes Reviewed:      Care Discussed with Consultants/Other Providers:    Martin Arzola MD, CMD, FACP    [phone] [place] [id number]  Office Tel: [phone]  Cell: [phone]

## 2020-10-25 LAB
ANION GAP SERPL CALC-SCNC: 13 MMOL/L — SIGNIFICANT CHANGE UP (ref 5–17)
BUN SERPL-MCNC: 12 MG/DL — SIGNIFICANT CHANGE UP (ref 7–23)
CALCIUM SERPL-MCNC: 8.9 MG/DL — SIGNIFICANT CHANGE UP (ref 8.4–10.5)
CHLORIDE SERPL-SCNC: 100 MMOL/L — SIGNIFICANT CHANGE UP (ref 96–108)
CO2 SERPL-SCNC: 22 MMOL/L — SIGNIFICANT CHANGE UP (ref 22–31)
CREAT SERPL-MCNC: 0.74 MG/DL — SIGNIFICANT CHANGE UP (ref 0.5–1.3)
GLUCOSE SERPL-MCNC: 86 MG/DL — SIGNIFICANT CHANGE UP (ref 70–99)
HCT VFR BLD CALC: 45.9 % — SIGNIFICANT CHANGE UP (ref 39–50)
HGB BLD-MCNC: 15.1 G/DL — SIGNIFICANT CHANGE UP (ref 13–17)
MCHC RBC-ENTMCNC: 27.4 PG — SIGNIFICANT CHANGE UP (ref 27–34)
MCHC RBC-ENTMCNC: 32.9 GM/DL — SIGNIFICANT CHANGE UP (ref 32–36)
MCV RBC AUTO: 83.2 FL — SIGNIFICANT CHANGE UP (ref 80–100)
NRBC # BLD: 0 /100 WBCS — SIGNIFICANT CHANGE UP (ref 0–0)
PLATELET # BLD AUTO: 209 K/UL — SIGNIFICANT CHANGE UP (ref 150–400)
POTASSIUM SERPL-MCNC: 3.6 MMOL/L — SIGNIFICANT CHANGE UP (ref 3.5–5.3)
POTASSIUM SERPL-SCNC: 3.6 MMOL/L — SIGNIFICANT CHANGE UP (ref 3.5–5.3)
RBC # BLD: 5.52 M/UL — SIGNIFICANT CHANGE UP (ref 4.2–5.8)
RBC # FLD: 12.9 % — SIGNIFICANT CHANGE UP (ref 10.3–14.5)
SARS-COV-2 IGG SERPL QL IA: NEGATIVE — SIGNIFICANT CHANGE UP
SARS-COV-2 IGM SERPL IA-ACNC: <0.1 INDEX — SIGNIFICANT CHANGE UP
SODIUM SERPL-SCNC: 135 MMOL/L — SIGNIFICANT CHANGE UP (ref 135–145)
WBC # BLD: 5.27 K/UL — SIGNIFICANT CHANGE UP (ref 3.8–10.5)
WBC # FLD AUTO: 5.27 K/UL — SIGNIFICANT CHANGE UP (ref 3.8–10.5)

## 2020-10-25 RX ADMIN — Medication 200 MILLIGRAM(S): at 05:13

## 2020-10-25 RX ADMIN — Medication 200 MILLIGRAM(S): at 13:29

## 2020-10-25 RX ADMIN — MEROPENEM 200 MILLIGRAM(S): 1 INJECTION INTRAVENOUS at 21:40

## 2020-10-25 RX ADMIN — MEROPENEM 200 MILLIGRAM(S): 1 INJECTION INTRAVENOUS at 13:29

## 2020-10-25 RX ADMIN — TAMSULOSIN HYDROCHLORIDE 0.4 MILLIGRAM(S): 0.4 CAPSULE ORAL at 21:40

## 2020-10-25 RX ADMIN — MEROPENEM 200 MILLIGRAM(S): 1 INJECTION INTRAVENOUS at 05:13

## 2020-10-25 NOTE — CONSULT NOTE ADULT - SUBJECTIVE AND OBJECTIVE BOX
· Provider Specialty	Urology     Reason for Admission:    Reason for Admission:  · Reason for Admission	The patient is a 45y Male complaining of fever.    Pt with hx of LUTS and elevated PVR   had recent cystoscopy  10/19/20- --> developed low grade temp seen 10/23 100.9 tmax   pt presents to [facility] with fever   russo place with elevated post void residual   pt seen today and hx obtain from family member    Events noted. Deaf.  CONSTITUTIONAL: No fever,  or fatigue  RESPIRATORY: No cough, wheezing,  No shortness of breath  CARDIOVASCULAR: No chest pain, palpitations,   GASTROINTESTINAL: No abdominal or epigastric pain.   NEUROLOGICAL: No headaches,     MEDICATIONS  (STANDING):  influenza   Vaccine 0.5 milliLiter(s) IntraMuscular once  meropenem  IVPB 2000 milliGRAM(s) IV Intermittent every 8 hours  phenazopyridine 200 milliGRAM(s) Oral three times a day  tamsulosin 0.4 milliGRAM(s) Oral at bedtime    MEDICATIONS  (PRN):  acetaminophen   Tablet .. 650 milliGRAM(s) Oral every 6 hours PRN Temp greater or equal to 38C (100.4F)  ALBUTerol    90 MICROgram(s) HFA Inhaler 1 Puff(s) Inhalation every 6 hours PRN Shortness of Breath and/or Wheezing        CAPILLARY BLOOD GLUCOSE        I&O's Summary    23 Oct 2020 07:  -  24 Oct 2020 07:00  --------------------------------------------------------  IN: 120 mL / OUT: 1900 mL / NET: -1780 mL    24 Oct 2020 07:01  -  24 Oct 2020 18:09  --------------------------------------------------------  IN: 0 mL / OUT: 1100 mL / NET: -1100 mL        PHYSICAL EXAM:    NECK: Supple, No JVD  CHEST/LUNG:non labored   ABDOMEN: Soft, Nontender, Nondistended; Bowel sounds present  EXTREMITIES:   No edema  NEUROLOGY: AAO    - russo in pace - no HELEN done - ? prostatitis - but office exam - small no masses     LABS:                        13.5   5.22  )-----------( 198      ( 24 Oct 2020 06:34 )             40.4     10-24    135  |  103  |  12  ----------------------------<  89  3.7   |  21<L>  |  0.82    Ca    8.4      24 Oct 2020 06:34    TPro  7.6  /  Alb  4.1  /  TBili  0.4  /  DBili  x   /  AST  21  /  ALT  26  /  AlkPhos  50  10-23    PT/INR - ( 23 Oct 2020 17:15 )   PT: 15.7 sec;   INR: 1.33 ratio         PTT - ( 23 Oct 2020 17:15 )  PTT:36.2 sec      Urinalysis Basic - ( 23 Oct 2020 20:17 )    Color: Dark Yellow / Appearance: Clear / S.011 / pH: x  Gluc: x / Ketone: Negative  / Bili: Small / Urobili: Negative   Blood: x / Protein: Negative / Nitrite: Positive   Leuk Esterase: Negative / RBC: 7 /hpf / WBC 1 /HPF   Sq Epi: x / Non Sq Epi: 0 /hpf / Bacteria: Few      RADIOLOGY & ADDITIONAL TESTS:  CT - thicken / inflamed bladder c/w cystitis   ID eval apprecia

## 2020-10-25 NOTE — PROGRESS NOTE ADULT - SUBJECTIVE AND OBJECTIVE BOX
Patient is a 45y old  Male who presents with a chief complaint of The patient is a 45y Male complaining of fever. (25 Oct 2020 18:09)      SUBJECTIVE / OVERNIGHT EVENTS:    Events noted.  CONSTITUTIONAL: No fever,  or fatigue  RESPIRATORY: No cough, wheezing,  No shortness of breath  CARDIOVASCULAR: No chest pain, palpitations  GASTROINTESTINAL: No abdominal or epigastric pain.   NEUROLOGICAL: No headaches,     MEDICATIONS  (STANDING):  influenza   Vaccine 0.5 milliLiter(s) IntraMuscular once  meropenem  IVPB 2000 milliGRAM(s) IV Intermittent every 8 hours  tamsulosin 0.4 milliGRAM(s) Oral at bedtime    MEDICATIONS  (PRN):  acetaminophen   Tablet .. 650 milliGRAM(s) Oral every 6 hours PRN Temp greater or equal to 38C (100.4F)  ALBUTerol    90 MICROgram(s) HFA Inhaler 1 Puff(s) Inhalation every 6 hours PRN Shortness of Breath and/or Wheezing        CAPILLARY BLOOD GLUCOSE        I&O's Summary    24 Oct 2020 07:  -  25 Oct 2020 07:00  --------------------------------------------------------  IN: 240 mL / OUT: 1900 mL / NET: -1660 mL    25 Oct 2020 07:  -  25 Oct 2020 19:39  --------------------------------------------------------  IN: 340 mL / OUT: 1600 mL / NET: -1260 mL        PHYSICAL EXAM:    NECK: Supple, No JVD  CHEST/LUNG: Clear to auscultation bilaterally; No wheezing.  HEART: Regular rate and rhythm; No murmurs, rubs, or gallops  ABDOMEN: Soft, Nontender, Nondistended; Bowel sounds present  EXTREMITIES:   No edema  NEUROLOGY: AAO X 3      LABS:                        15.1   5.27  )-----------( 209      ( 25 Oct 2020 06:51 )             45.9     10-    135  |  100  |  12  ----------------------------<  86  3.6   |  22  |  0.74    Ca    8.9      25 Oct 2020 06:51            Urinalysis Basic - ( 23 Oct 2020 20:17 )    Color: Dark Yellow / Appearance: Clear / S.011 / pH: x  Gluc: x / Ketone: Negative  / Bili: Small / Urobili: Negative   Blood: x / Protein: Negative / Nitrite: Positive   Leuk Esterase: Negative / RBC: 7 /hpf / WBC 1 /HPF   Sq Epi: x / Non Sq Epi: 0 /hpf / Bacteria: Few      CAPILLARY BLOOD GLUCOSE        10-24 @ 00:56  Culture-urine --  Culture results   No growth  method type --  Organism --  Organism Identification --  Specimen source .Urine Clean Catch (Midstream)  10-23 @ 20:32  Culture-urine --  Culture results   No growth to date.  method type --  Organism --  Organism Identification --  Specimen source .Blood Blood-Peripheral           10-24 @ 00:56  Culture blood --  Culture results   No growth  Gram stain --  Gram stain blood --  Method type --  Organism --  Organism identification --  Specimen source .Urine Clean Catch (Midstream)   10-23 @ 20:32  Culture blood --  Culture results   No growth to date.  Gram stain --  Gram stain blood --  Method type --  Organism --  Organism identification --  Specimen source .Blood Blood-Peripheral      RADIOLOGY & ADDITIONAL TESTS:    Imaging Personally Reviewed:    Consultant(s) Notes Reviewed:      Care Discussed with Consultants/Other Providers:    Martin Arzola MD, CMD, FACP    [address] [id number]  Office Tel: [phone]  Cell: [phone]

## 2020-10-25 NOTE — CONSULT NOTE ADULT - ASSESSMENT
Pt with hx of elevated PVR / LUTS - now with fever s/p cysto   plan - f/u cx   cont abx   leave russo for out pt TOV   
45M hx urinary retention presents with Fever  x  3  days,  s/p  F/C  insertion,  negative  COVID, patient  is  on  bactrim post cystoscopy. intermittently had hematuria post procedure. Patient denies chest pain, SOB, cough, abd pain, N/V/D/C, weakness, HA.   As per out pt urologist: They did urocuff procedure in Aug 2020 to look at his prostate size which was enlarged so on 10/19/20 - cystoscopy + US via rectum to ddx urinary retention and enlarged prostate. since then pt has been spiking temp.  started on bactrim. Came to ER today for fever and hematuria  (23 Oct 2020 18:34)    ER vitals:  Tmax 102.1, P 122, BP 94/66.  No leukocytosis.  UA (+) nitrites.  RVP and SARS-Cov-2 (-).  Cxr clear lungs, KUB xray (-) for foreign body correlating to stent.   Pt given vanco/zosyn in ER, now on rocephin.  ID consulted for further abx managment.      Pt's wife available for sign language translation.     Sepsis/UTI:    - Pt with fever, tachycardia, low BP.  s/p recent urological procedure, h/o enlarged prostate and urinary retention.  Had been on Bactrim as outpt.  Pt p/w dysuria, increased urinary frequency, urinary retention and hematuria.  s/p Russo placement.    - Pt s/p vanco/zosyn/rocephin.  Cont to have high fever.  Will broaden to meropenem.  f/u ucx, blood cx    - Monitor Russo output.  Recommend URology f/u (pt follows with Dr. Papito Marley)    - Recommend CTap with IV contrast to evaluate for pyelo, prostate abscess    BPH:    - Cont russo for now.      - Cont tamsulosin.    - Urology f/u      d/w pt and wife at bedside.     Will follow,    Shona Bruno  [phone]

## 2020-10-25 NOTE — PROGRESS NOTE ADULT - ASSESSMENT
45M hx urinary retention presents with Fever  x  3  days,  s/p  F/C  insertion,  negative  COVID, patient  is  on  bactrim post cystoscopy. intermittently had hematuria post procedure. Patient denies chest pain, SOB, cough, abd pain, N/V/D/C, weakness, HA.   As per out pt urologist: They did urocuff procedure in Aug 2020 to look at his prostate size which was enlarged so on 10/19/20 - cystoscopy + US via rectum to ddx urinary retention and enlarged prostate. since then pt has been spiking temp.  started on bactrim. Came to ER today for fever and hematuria  (23 Oct 2020 18:34)    ER vitals:  Tmax 102.1, P 122, BP 94/66.  No leukocytosis.  UA (+) nitrites.  RVP and SARS-Cov-2 (-).  Cxr clear lungs, KUB xray (-) for foreign body correlating to stent.   Pt given vanco/zosyn in ER, now on rocephin.  ID consulted for further abx managment.      Pt's wife available for sign language translation.     Sepsis/UTI:    - Pt with fever, tachycardia, low BP.  s/p recent urological procedure, h/o enlarged prostate and urinary retention.  Had been on Bactrim as outpt.  Pt p/w dysuria, increased urinary frequency, urinary retention and hematuria.  s/p Russo placement.    - Pt s/p vanco/zosyn/rocephin.  Cont to have high fever.  Will broaden to meropenem.  f/u ucx, blood cx    - Monitor Russo output.  Recommend URology f/u (pt follows with Dr. Papito Marley)    -  CTap with IV contrast w/o pyelo, prostate abscess, obstruction.  (+) bladder wall thickening c/w acute cystitis.     BPH:    - Cont russo for now.      - Cont tamsulosin.    - Urology f/u noted.    * culture data negative.  Cont meropenem for now.  Will check outpt cultures, if any, to help decide abx course.       d/w pt and wife at bedside.     Will follow,    Shona Bruno  [phone]

## 2020-10-25 NOTE — PROGRESS NOTE ADULT - SUBJECTIVE AND OBJECTIVE BOX
Infectious Diseases progress note:    Subjective:  NAD, afebrile.  Feeling better.  Denies abd pain, flank pain.  Wife at bedside.     ROS:  CONSTITUTIONAL:  No fever, chills, rigors  CARDIOVASCULAR:  No chest pain or palpitations  RESPIRATORY:   No SOB, cough, dyspnea on exertion.  No wheezing  GASTROINTESTINAL:  No abd pain, N/V, diarrhea/constipation  EXTREMITIES:  No swelling or joint pain  GENITOURINARY:  No burning on urination, increased frequency or urgency.  No flank pain  NEUROLOGIC:  No HA, visual disturbances  SKIN: No rashes    Allergies    No Known Allergies    Intolerances        ANTIBIOTICS/RELEVANT:  antimicrobials  meropenem  IVPB 2000 milliGRAM(s) IV Intermittent every 8 hours    immunologic:  influenza   Vaccine 0.5 milliLiter(s) IntraMuscular once    OTHER:  acetaminophen   Tablet .. 650 milliGRAM(s) Oral every 6 hours PRN  ALBUTerol    90 MICROgram(s) HFA Inhaler 1 Puff(s) Inhalation every 6 hours PRN  tamsulosin 0.4 milliGRAM(s) Oral at bedtime      Objective:  Vital Signs Last 24 Hrs  T(C): 36.7 (25 Oct 2020 14:31), Max: 38.1 (24 Oct 2020 22:31)  T(F): 98.1 (25 Oct 2020 14:31), Max: 100.6 (24 Oct 2020 22:31)  HR: 80 (25 Oct 2020 14:31) (80 - 82)  BP: 106/68 (25 Oct 2020 14:31) (100/66 - 106/68)  BP(mean): --  RR: 19 (25 Oct 2020 14:31) (18 - 19)  SpO2: 95% (25 Oct 2020 14:31) (94% - 95%)    PHYSICAL EXAM:  Constitutional:NAD  Eyes:BERKLEY, EOMI  Ear/Nose/Throat: no thrush, mucositis.  Moist mucous membranes	  Neck:no JVD, no lymphadenopathy, supple  Respiratory: CTA lilliam  Cardiovascular: S1S2 RRR, no murmurs  Gastrointestinal:soft, nontender,  nondistended (+) BS  Extremities:no e/e/c  Skin:  no rashes, open wounds or ulcerations  :  Indwelling russo with yellow urine, some sediment        LABS:                        15.1   5.27  )-----------( 209      ( 25 Oct 2020 06:51 )             45.9     10-25    135  |  100  |  12  ----------------------------<  86  3.6   |  22  |  0.74    Ca    8.9      25 Oct 2020 06:51                      [facility]          MICROBIOLOGY:    Culture - Urine (10.24.20 @ 00:56)   Specimen Source: .Urine Clean Catch (Midstream)   Culture Results:   No growth       Culture - Blood (10.23.20 @ 20:32)   Specimen Source: .Blood Blood-Peripheral   Culture Results:   No growth to date.       Culture - Blood (10.23.20 @ 20:32)   Specimen Source: .Blood Blood-Peripheral   Culture Results:   No growth to date.         RADIOLOGY & ADDITIONAL STUDIES:    < from: CT Abdomen and Pelvis w/ IV Cont (10.24.20 @ 17:40) >    EXAM:  CT ABDOMEN AND PELVIS IC                            PROCEDURE DATE:  10/24/2020            INTERPRETATION:  CLINICAL INFORMATION: Sepsis, hematuria, and fever. Evaluate for infection, pyelonephritis or abscess.    COMPARISON: None.    PROCEDURE:  CT of the Abdomen and Pelvis was performed with intravenous contrast.  Intravenous contrast: 90 ml Omnipaque 350. 10 ml discarded.  Oral contrast: None.  Sagittal and coronal reformats were performed.    FINDINGS:  LOWER CHEST: Right lower lobe subsegmental atelectasis.    LIVER: Within normal limits.  BILE DUCTS: Normal caliber.  GALLBLADDER: Within normal limits.  SPLEEN: Within normal limits.  PANCREAS: Within normal limits.  ADRENALS: Within normal limits.  KIDNEYS/URETERS: Symmetric enhancement. No renal stones or hydronephrosis. A few subcentimeter hypodense renal foci, too small to characterize. No perinephric collection/abscess.    BLADDER: Russo catheter. Diffuse mural thickening consistent with history of infection.  REPRODUCTIVE ORGANS: Prostate within normal limits.    BOWEL: No bowel obstruction. Appendix is normal.  PERITONEUM: No ascites.  VESSELS: Within normal limits.  RETROPERITONEUM/LYMPH NODES: No lymphadenopathy.  ABDOMINAL WALL: Tiny fat-containing umbilical hernia.  BONES: Degenerative changes.    IMPRESSION:  Diffuse bladder wall thickening consistent with history of urinary tract infection.    No evidence of ascending myelitis/pyelonephritis.    < end of copied text >

## 2020-10-25 NOTE — PROGRESS NOTE ADULT - ASSESSMENT
The patient is a 45y Male complaining of fever.    UTI:  IV Meropenem  ID f/up  Cont pyridium  CT Abd/P: UTI    BPH:  On flomax    Br Asthma:  Albuterol    Dw Pt's wife

## 2020-10-26 ENCOUNTER — TRANSCRIPTION ENCOUNTER (OUTPATIENT)
Age: 45
End: 2020-10-26

## 2020-10-26 LAB
ALBUMIN SERPL ELPH-MCNC: 3.9 G/DL — SIGNIFICANT CHANGE UP (ref 3.3–5)
ALP SERPL-CCNC: 40 U/L — SIGNIFICANT CHANGE UP (ref 40–120)
ALT FLD-CCNC: 49 U/L — HIGH (ref 10–45)
ANION GAP SERPL CALC-SCNC: 11 MMOL/L — SIGNIFICANT CHANGE UP (ref 5–17)
AST SERPL-CCNC: 30 U/L — SIGNIFICANT CHANGE UP (ref 10–40)
BASOPHILS # BLD AUTO: 0 K/UL — SIGNIFICANT CHANGE UP (ref 0–0.2)
BASOPHILS NFR BLD AUTO: 0 % — SIGNIFICANT CHANGE UP (ref 0–2)
BILIRUB SERPL-MCNC: 0.6 MG/DL — SIGNIFICANT CHANGE UP (ref 0.2–1.2)
BUN SERPL-MCNC: 14 MG/DL — SIGNIFICANT CHANGE UP (ref 7–23)
CALCIUM SERPL-MCNC: 9.1 MG/DL — SIGNIFICANT CHANGE UP (ref 8.4–10.5)
CHLORIDE SERPL-SCNC: 99 MMOL/L — SIGNIFICANT CHANGE UP (ref 96–108)
CO2 SERPL-SCNC: 22 MMOL/L — SIGNIFICANT CHANGE UP (ref 22–31)
CREAT SERPL-MCNC: 0.73 MG/DL — SIGNIFICANT CHANGE UP (ref 0.5–1.3)
ELLIPTOCYTES BLD QL SMEAR: SLIGHT — SIGNIFICANT CHANGE UP
EOSINOPHIL # BLD AUTO: 0.49 K/UL — SIGNIFICANT CHANGE UP (ref 0–0.5)
EOSINOPHIL NFR BLD AUTO: 7.8 % — HIGH (ref 0–6)
GLUCOSE SERPL-MCNC: 99 MG/DL — SIGNIFICANT CHANGE UP (ref 70–99)
HCT VFR BLD CALC: 42.6 % — SIGNIFICANT CHANGE UP (ref 39–50)
HGB BLD-MCNC: 14.1 G/DL — SIGNIFICANT CHANGE UP (ref 13–17)
LYMPHOCYTES # BLD AUTO: 2.74 K/UL — SIGNIFICANT CHANGE UP (ref 1–3.3)
LYMPHOCYTES # BLD AUTO: 43.5 % — SIGNIFICANT CHANGE UP (ref 13–44)
MANUAL SMEAR VERIFICATION: SIGNIFICANT CHANGE UP
MCHC RBC-ENTMCNC: 27.1 PG — SIGNIFICANT CHANGE UP (ref 27–34)
MCHC RBC-ENTMCNC: 33.1 GM/DL — SIGNIFICANT CHANGE UP (ref 32–36)
MCV RBC AUTO: 81.8 FL — SIGNIFICANT CHANGE UP (ref 80–100)
MONOCYTES # BLD AUTO: 0.38 K/UL — SIGNIFICANT CHANGE UP (ref 0–0.9)
MONOCYTES NFR BLD AUTO: 6.1 % — SIGNIFICANT CHANGE UP (ref 2–14)
NEUTROPHILS # BLD AUTO: 2.62 K/UL — SIGNIFICANT CHANGE UP (ref 1.8–7.4)
NEUTROPHILS NFR BLD AUTO: 41.7 % — LOW (ref 43–77)
PLAT MORPH BLD: NORMAL — SIGNIFICANT CHANGE UP
PLATELET # BLD AUTO: 224 K/UL — SIGNIFICANT CHANGE UP (ref 150–400)
POIKILOCYTOSIS BLD QL AUTO: SLIGHT — SIGNIFICANT CHANGE UP
POTASSIUM SERPL-MCNC: 3.9 MMOL/L — SIGNIFICANT CHANGE UP (ref 3.5–5.3)
POTASSIUM SERPL-SCNC: 3.9 MMOL/L — SIGNIFICANT CHANGE UP (ref 3.5–5.3)
PROT SERPL-MCNC: 7 G/DL — SIGNIFICANT CHANGE UP (ref 6–8.3)
RBC # BLD: 5.21 M/UL — SIGNIFICANT CHANGE UP (ref 4.2–5.8)
RBC # FLD: 12.8 % — SIGNIFICANT CHANGE UP (ref 10.3–14.5)
RBC BLD AUTO: ABNORMAL
SODIUM SERPL-SCNC: 132 MMOL/L — LOW (ref 135–145)
TARGETS BLD QL SMEAR: SLIGHT — SIGNIFICANT CHANGE UP
VARIANT LYMPHS # BLD: 0.9 % — SIGNIFICANT CHANGE UP (ref 0–6)
WBC # BLD: 6.29 K/UL — SIGNIFICANT CHANGE UP (ref 3.8–10.5)
WBC # FLD AUTO: 6.29 K/UL — SIGNIFICANT CHANGE UP (ref 3.8–10.5)

## 2020-10-26 RX ORDER — CIPROFLOXACIN LACTATE 400MG/40ML
500 VIAL (ML) INTRAVENOUS EVERY 12 HOURS
Refills: 0 | Status: DISCONTINUED | OUTPATIENT
Start: 2020-10-26 | End: 2020-10-27

## 2020-10-26 RX ORDER — SODIUM CHLORIDE 9 MG/ML
500 INJECTION INTRAMUSCULAR; INTRAVENOUS; SUBCUTANEOUS
Refills: 0 | Status: COMPLETED | OUTPATIENT
Start: 2020-10-26 | End: 2020-10-26

## 2020-10-26 RX ADMIN — Medication 500 MILLIGRAM(S): at 18:24

## 2020-10-26 RX ADMIN — TAMSULOSIN HYDROCHLORIDE 0.4 MILLIGRAM(S): 0.4 CAPSULE ORAL at 21:38

## 2020-10-26 RX ADMIN — MEROPENEM 200 MILLIGRAM(S): 1 INJECTION INTRAVENOUS at 05:12

## 2020-10-26 RX ADMIN — SODIUM CHLORIDE 100 MILLILITER(S): 9 INJECTION INTRAMUSCULAR; INTRAVENOUS; SUBCUTANEOUS at 10:34

## 2020-10-26 NOTE — PROGRESS NOTE ADULT - ASSESSMENT
45M hx urinary retention presents with Fever  x  3  days,  s/p  F/C  insertion,  negative  COVID, patient  is  on  bactrim post cystoscopy. intermittently had hematuria post procedure. Patient denies chest pain, SOB, cough, abd pain, N/V/D/C, weakness, HA.   As per out pt urologist: They did urocuff procedure in Aug 2020 to look at his prostate size which was enlarged so on 10/19/20 - cystoscopy + US via rectum to ddx urinary retention and enlarged prostate. since then pt has been spiking temp.  started on bactrim. Came to ER today for fever and hematuria  (23 Oct 2020 18:34)    ER vitals:  Tmax 102.1, P 122, BP 94/66.  No leukocytosis.  UA (+) nitrites.  RVP and SARS-Cov-2 (-).  Cxr clear lungs, KUB xray (-) for foreign body correlating to stent.   Pt given vanco/zosyn in ER, now on rocephin.  ID consulted for further abx managment.      Pt's wife available for sign language translation.     Sepsis/UTI:    - Pt with fever, tachycardia, low BP.  s/p recent urological procedure, h/o enlarged prostate and urinary retention.  Had been on Bactrim as outpt.  Pt p/w dysuria, increased urinary frequency, urinary retention and hematuria.  s/p Russo placement.    - Pt s/p vanco/zosyn/rocephin.  Cont to have high fever.  Will broaden to meropenem.  f/u ucx, blood cx    - Monitor Russo output.  Recommend URology f/u (pt follows with Dr. Papito Marley)    -  CTap with IV contrast w/o pyelo, prostate abscess, obstruction.  (+) bladder wall thickening c/w acute cystitis.     BPH:    - Cont russo for now.      - Cont tamsulosin.    - Urology f/u noted.    * culture data negative.  Checked outpt urine cx (10/23) also reported as no growth.  Likely because pt was already on abx (bactrim) and may have masked results.  Will de-escalate to PO cipro 500mg bid, if pt continues to improve, can d/c on 7 day course tomorrow.        d/w pt and wife at bedside.     Will follow,    Shona Bruno  [phone]

## 2020-10-26 NOTE — DISCHARGE NOTE PROVIDER - CARE PROVIDER_API CALL
Papito Marley)  Urology  [address], [address]  [place] [address]  Phone: [phone]  Fax: [phone]  Follow Up Time: 1 week    MENDOZA CASTREJON  Internal Medicine  [address] [place] [address]  Phone: [phone]  Fax: [phone]  Follow Up Time: 1-3 days

## 2020-10-26 NOTE — DISCHARGE NOTE PROVIDER - NSDCMRMEDTOKEN_GEN_ALL_CORE_FT
Advil: 3 tab(s) orally , As Needed  Bactrim  mg-160 mg oral tablet: 1 tab(s) orally 2 times a day for 7 days  phenazopyridine 200 mg oral tablet: 1 tab(s) orally 3 times a day for 5 days  Proventil HFA 90 mcg/inh inhalation aerosol: 1 puff(s) inhaled once a day (in the evening), As Needed  tamsulosin 0.4 mg oral capsule: 1 cap(s) orally once a day  Tylenol: 2 tab(s) orally , As Needed   Advil: 3 tab(s) orally , As Needed  ciprofloxacin 500 mg oral tablet: 1 tab(s) orally every 12 hours  Proventil HFA 90 mcg/inh inhalation aerosol: 1 puff(s) inhaled once a day (in the evening), As Needed  tamsulosin 0.4 mg oral capsule: 1 cap(s) orally once a day  Tylenol: 2 tab(s) orally , As Needed

## 2020-10-26 NOTE — PROGRESS NOTE ADULT - SUBJECTIVE AND OBJECTIVE BOX
Patient is a 45y old  Male who presents with a chief complaint of The patient is a 45y Male complaining of fever. (26 Oct 2020 21:37)      SUBJECTIVE / OVERNIGHT EVENTS:    Events noted.  CONSTITUTIONAL: No fever,  or fatigue  RESPIRATORY: No cough, wheezing,  No shortness of breath  CARDIOVASCULAR: No chest pain, palpitations,  GASTROINTESTINAL: No abdominal or epigastric pain.   NEUROLOGICAL: No headaches,     MEDICATIONS  (STANDING):  ciprofloxacin     Tablet 500 milliGRAM(s) Oral every 12 hours  influenza   Vaccine 0.5 milliLiter(s) IntraMuscular once  tamsulosin 0.4 milliGRAM(s) Oral at bedtime    MEDICATIONS  (PRN):  acetaminophen   Tablet .. 650 milliGRAM(s) Oral every 6 hours PRN Temp greater or equal to 38C (100.4F)  ALBUTerol    90 MICROgram(s) HFA Inhaler 1 Puff(s) Inhalation every 6 hours PRN Shortness of Breath and/or Wheezing        CAPILLARY BLOOD GLUCOSE        I&O's Summary    25 Oct 2020 07:01  -  26 Oct 2020 07:00  --------------------------------------------------------  IN: 340 mL / OUT: 3100 mL / NET: -2760 mL    26 Oct 2020 07:01  -  26 Oct 2020 22:45  --------------------------------------------------------  IN: 1320 mL / OUT: 1150 mL / NET: 170 mL        PHYSICAL EXAM:    NECK: Supple, No JVD  CHEST/LUNG: Clear to auscultation bilaterally; No wheezing.  HEART: Regular rate and rhythm; No murmurs, rubs, or gallops  ABDOMEN: Soft, Nontender, Nondistended; Bowel sounds present  EXTREMITIES:   No edema  NEUROLOGY: AAO X 3      LABS:                        14.1   6.29  )-----------( 224      ( 26 Oct 2020 06:58 )             42.6     10-26    132<L>  |  99  |  14  ----------------------------<  99  3.9   |  22  |  0.73    Ca    9.1      26 Oct 2020 06:52    TPro  7.0  /  Alb  3.9  /  TBili  0.6  /  DBili  x   /  AST  30  /  ALT  49<H>  /  AlkPhos  40  10-26            CAPILLARY BLOOD GLUCOSE        10-24 @ 00:56  Culture-urine --  Culture results   No growth  method type --  Organism --  Organism Identification --  Specimen source .Urine Clean Catch (Midstream)  10-23 @ 20:32  Culture-urine --  Culture results   No growth to date.  method type --  Organism --  Organism Identification --  Specimen source .Blood Blood-Peripheral           10-24 @ 00:56  Culture blood --  Culture results   No growth  Gram stain --  Gram stain blood --  Method type --  Organism --  Organism identification --  Specimen source .Urine Clean Catch (Midstream)   10-23 @ 20:32  Culture blood --  Culture results   No growth to date.  Gram stain --  Gram stain blood --  Method type --  Organism --  Organism identification --  Specimen source .Blood Blood-Peripheral      RADIOLOGY & ADDITIONAL TESTS:    Imaging Personally Reviewed:    Consultant(s) Notes Reviewed:      Care Discussed with Consultants/Other Providers:    Martin Arzola MD, CMD, FACP    [address] [address]  Office Tel: [phone]  Cell: [phone]

## 2020-10-26 NOTE — PROGRESS NOTE ADULT - ASSESSMENT
The patient is a 45y Male complaining of fever.    UTI:  IV Meropenem  ID f/up  Cont pyridium  CT Abd/P: UTI    BPH:  On flomax    Br Asthma:  Albuterol

## 2020-10-26 NOTE — DISCHARGE NOTE PROVIDER - NSDCCPCAREPLAN_GEN_ALL_CORE_FT
PRINCIPAL DISCHARGE DIAGNOSIS  Diagnosis: Sepsis  Assessment and Plan of Treatment: Resolved      SECONDARY DISCHARGE DIAGNOSES  Diagnosis: Acute UTI  Assessment and Plan of Treatment: Complete antibiotics and follow upn norma PCP and urology    Diagnosis: Urinary retention  Assessment and Plan of Treatment: Fuller care and follow uo with urology outpatient     PRINCIPAL DISCHARGE DIAGNOSIS  Diagnosis: Sepsis  Assessment and Plan of Treatment: Resolved, treated, complete antibiotics   follow up with pcp within [facility], unable to pass urine, intractable abdominal pain      SECONDARY DISCHARGE DIAGNOSES  Diagnosis: Acute UTI  Assessment and Plan of Treatment: Complete antibiotics and follow upn norma PCP and urology    Diagnosis: Urinary retention  Assessment and Plan of Treatment: Fuller care and follow uo with urology outpatient  take med as prescribed     PRINCIPAL DISCHARGE DIAGNOSIS  Diagnosis: Sepsis  Assessment and Plan of Treatment: Resolved, treated, complete antibiotics   [facility], unable to pass urine, intractable abdominal pain      SECONDARY DISCHARGE DIAGNOSES  Diagnosis: Acute UTI  Assessment and Plan of Treatment: Complete antibiotics and follow upn norma PCP and urology    Diagnosis: Urinary retention  Assessment and Plan of Treatment: Fuller care and follow up with urology outpatient for trial of void  take med as prescribed

## 2020-10-26 NOTE — DISCHARGE NOTE PROVIDER - PROVIDER TOKENS
PROVIDER:[TOKEN:[[id number]],FOLLOWUP:[1 week]],PROVIDER:[TOKEN:[[id number]],FOLLOWUP:[1-3 days]]

## 2020-10-26 NOTE — DISCHARGE NOTE PROVIDER - HOSPITAL COURSE
This is a 45M Deaf hx urinary retention presents with Fever  x  3  days,  s/p  F/C  insertion,  negative  COVID, patient  is  on  bactrim post cystoscopy. intermittently had hematuria post procedure. Patient denies chest pain, SOB, cough, abd pain, N/V/D/C, weakness, HA.   As per out pt urologist: They did urology procedure in Aug 2020 to look at his prostate size which was enlarged so on 10/19/20 - cystoscopy + US via rectum to ddx urinary retention and enlarged prostate. since then pt has been spiking temp.  started on bactrim. Came to ER today for fever and hematuria  (23 Oct 2020 18:34)    ER vitals:  Tmax 102.1, P 122, BP 94/66.  No leukocytosis.  UA (+) nitrites.  RVP and SARS-Cov-2 (-).  Cxr clear lungs, KUB xray (-) for foreign body correlating to stent.   Pt given vanco/zosyn in ER, now on rocephin.  ID consulted for further abx management.      Pt's wife available for sign language translation.     Sepsis/UTI:    - Pt with fever, tachycardia, low BP.  s/p recent urological procedure, h/o enlarged prostate and urinary retention.  Had been on Bactrim as outpt.  Pt p/w dysuria, increased urinary frequency, urinary retention and hematuria.  s/p Russo placement.    - Pt s/p vanco/zosyn/rocephin.  Cont to have high fever.  Will broaden to meropenem.  f/u ucx, blood cx    - Monitor Russo output.  Recommend URology f/u (pt follows with Dr. Papito Marley)    -  CTap with IV contrast w/o pyelo, prostate abscess, obstruction.  (+) bladder wall thickening c/w acute cystitis.     BPH:    - Cont russo for now.      - Cont tamsulosin.    - Urology f/u noted.    * culture data negative.  Checked outpt urine cx (10/23) also reported as no growth.  Likely because pt was already on abx (bactrim) and may have masked results.  Will de-escalate to PO cipro 500mg bid, if pt continues to improve, can d/c on 7 day course tomorrow.    cleared for discharge home per Dr Arzola, with russo to follow up  and PCP

## 2020-10-27 ENCOUNTER — TRANSCRIPTION ENCOUNTER (OUTPATIENT)
Age: 45
End: 2020-10-27

## 2020-10-27 VITALS
TEMPERATURE: 98 F | RESPIRATION RATE: 18 BRPM | OXYGEN SATURATION: 95 % | DIASTOLIC BLOOD PRESSURE: 73 MMHG | HEART RATE: 95 BPM | SYSTOLIC BLOOD PRESSURE: 110 MMHG

## 2020-10-27 LAB
ALBUMIN SERPL ELPH-MCNC: 3.8 G/DL — SIGNIFICANT CHANGE UP (ref 3.3–5)
ALP SERPL-CCNC: 41 U/L — SIGNIFICANT CHANGE UP (ref 40–120)
ALT FLD-CCNC: 58 U/L — HIGH (ref 10–45)
ANION GAP SERPL CALC-SCNC: 13 MMOL/L — SIGNIFICANT CHANGE UP (ref 5–17)
AST SERPL-CCNC: 30 U/L — SIGNIFICANT CHANGE UP (ref 10–40)
BASOPHILS # BLD AUTO: 0 K/UL — SIGNIFICANT CHANGE UP (ref 0–0.2)
BASOPHILS NFR BLD AUTO: 0 % — SIGNIFICANT CHANGE UP (ref 0–2)
BILIRUB SERPL-MCNC: 0.7 MG/DL — SIGNIFICANT CHANGE UP (ref 0.2–1.2)
BUN SERPL-MCNC: 13 MG/DL — SIGNIFICANT CHANGE UP (ref 7–23)
CALCIUM SERPL-MCNC: 9.1 MG/DL — SIGNIFICANT CHANGE UP (ref 8.4–10.5)
CHLORIDE SERPL-SCNC: 101 MMOL/L — SIGNIFICANT CHANGE UP (ref 96–108)
CO2 SERPL-SCNC: 23 MMOL/L — SIGNIFICANT CHANGE UP (ref 22–31)
CREAT SERPL-MCNC: 0.63 MG/DL — SIGNIFICANT CHANGE UP (ref 0.5–1.3)
EOSINOPHIL # BLD AUTO: 0.66 K/UL — HIGH (ref 0–0.5)
EOSINOPHIL NFR BLD AUTO: 6.3 % — HIGH (ref 0–6)
GLUCOSE SERPL-MCNC: 103 MG/DL — HIGH (ref 70–99)
HCT VFR BLD CALC: 43.1 % — SIGNIFICANT CHANGE UP (ref 39–50)
HGB BLD-MCNC: 14.4 G/DL — SIGNIFICANT CHANGE UP (ref 13–17)
LYMPHOCYTES # BLD AUTO: 2.81 K/UL — SIGNIFICANT CHANGE UP (ref 1–3.3)
LYMPHOCYTES # BLD AUTO: 27 % — SIGNIFICANT CHANGE UP (ref 13–44)
MANUAL SMEAR VERIFICATION: SIGNIFICANT CHANGE UP
MCHC RBC-ENTMCNC: 27.3 PG — SIGNIFICANT CHANGE UP (ref 27–34)
MCHC RBC-ENTMCNC: 33.4 GM/DL — SIGNIFICANT CHANGE UP (ref 32–36)
MCV RBC AUTO: 81.8 FL — SIGNIFICANT CHANGE UP (ref 80–100)
MONOCYTES # BLD AUTO: 0.09 K/UL — SIGNIFICANT CHANGE UP (ref 0–0.9)
MONOCYTES NFR BLD AUTO: 0.9 % — LOW (ref 2–14)
NEUTROPHILS # BLD AUTO: 6.01 K/UL — SIGNIFICANT CHANGE UP (ref 1.8–7.4)
NEUTROPHILS NFR BLD AUTO: 57.7 % — SIGNIFICANT CHANGE UP (ref 43–77)
NRBC # BLD: 1 /100 — HIGH (ref 0–0)
PLAT MORPH BLD: NORMAL — SIGNIFICANT CHANGE UP
PLATELET # BLD AUTO: 266 K/UL — SIGNIFICANT CHANGE UP (ref 150–400)
POTASSIUM SERPL-MCNC: 4.1 MMOL/L — SIGNIFICANT CHANGE UP (ref 3.5–5.3)
POTASSIUM SERPL-SCNC: 4.1 MMOL/L — SIGNIFICANT CHANGE UP (ref 3.5–5.3)
PROT SERPL-MCNC: 7.1 G/DL — SIGNIFICANT CHANGE UP (ref 6–8.3)
RBC # BLD: 5.27 M/UL — SIGNIFICANT CHANGE UP (ref 4.2–5.8)
RBC # FLD: 12.9 % — SIGNIFICANT CHANGE UP (ref 10.3–14.5)
RBC BLD AUTO: SIGNIFICANT CHANGE UP
SODIUM SERPL-SCNC: 137 MMOL/L — SIGNIFICANT CHANGE UP (ref 135–145)
VARIANT LYMPHS # BLD: 8.1 % — HIGH (ref 0–6)
WBC # BLD: 10.41 K/UL — SIGNIFICANT CHANGE UP (ref 3.8–10.5)
WBC # FLD AUTO: 10.41 K/UL — SIGNIFICANT CHANGE UP (ref 3.8–10.5)

## 2020-10-27 PROCEDURE — 84295 ASSAY OF SERUM SODIUM: CPT

## 2020-10-27 PROCEDURE — 82330 ASSAY OF CALCIUM: CPT

## 2020-10-27 PROCEDURE — 80048 BASIC METABOLIC PNL TOTAL CA: CPT

## 2020-10-27 PROCEDURE — 74177 CT ABD & PELVIS W/CONTRAST: CPT

## 2020-10-27 PROCEDURE — 82803 BLOOD GASES ANY COMBINATION: CPT

## 2020-10-27 PROCEDURE — 82947 ASSAY GLUCOSE BLOOD QUANT: CPT

## 2020-10-27 PROCEDURE — 85610 PROTHROMBIN TIME: CPT

## 2020-10-27 PROCEDURE — 84132 ASSAY OF SERUM POTASSIUM: CPT

## 2020-10-27 PROCEDURE — 85025 COMPLETE CBC W/AUTO DIFF WBC: CPT

## 2020-10-27 PROCEDURE — 0225U NFCT DS DNA&RNA 21 SARSCOV2: CPT

## 2020-10-27 PROCEDURE — 87040 BLOOD CULTURE FOR BACTERIA: CPT

## 2020-10-27 PROCEDURE — 85014 HEMATOCRIT: CPT

## 2020-10-27 PROCEDURE — 81001 URINALYSIS AUTO W/SCOPE: CPT

## 2020-10-27 PROCEDURE — 85730 THROMBOPLASTIN TIME PARTIAL: CPT

## 2020-10-27 PROCEDURE — 90686 IIV4 VACC NO PRSV 0.5 ML IM: CPT

## 2020-10-27 PROCEDURE — 71045 X-RAY EXAM CHEST 1 VIEW: CPT

## 2020-10-27 PROCEDURE — 83605 ASSAY OF LACTIC ACID: CPT

## 2020-10-27 PROCEDURE — 87086 URINE CULTURE/COLONY COUNT: CPT

## 2020-10-27 PROCEDURE — 85018 HEMOGLOBIN: CPT

## 2020-10-27 PROCEDURE — 80053 COMPREHEN METABOLIC PANEL: CPT

## 2020-10-27 PROCEDURE — 51702 INSERT TEMP BLADDER CATH: CPT

## 2020-10-27 PROCEDURE — 86769 SARS-COV-2 COVID-19 ANTIBODY: CPT

## 2020-10-27 PROCEDURE — 96375 TX/PRO/DX INJ NEW DRUG ADDON: CPT | Mod: XU

## 2020-10-27 PROCEDURE — 99285 EMERGENCY DEPT VISIT HI MDM: CPT | Mod: 25

## 2020-10-27 PROCEDURE — 85027 COMPLETE CBC AUTOMATED: CPT

## 2020-10-27 PROCEDURE — 74018 RADEX ABDOMEN 1 VIEW: CPT

## 2020-10-27 PROCEDURE — 82435 ASSAY OF BLOOD CHLORIDE: CPT

## 2020-10-27 PROCEDURE — 96374 THER/PROPH/DIAG INJ IV PUSH: CPT | Mod: XU

## 2020-10-27 PROCEDURE — 93005 ELECTROCARDIOGRAM TRACING: CPT | Mod: XU

## 2020-10-27 RX ORDER — CIPROFLOXACIN LACTATE 400MG/40ML
1 VIAL (ML) INTRAVENOUS
Qty: 12 | Refills: 0
Start: 2020-10-27 | End: 2020-11-01

## 2020-10-27 RX ADMIN — INFLUENZA VIRUS VACCINE 0.5 MILLILITER(S): 15; 15; 15; 15 SUSPENSION INTRAMUSCULAR at 12:26

## 2020-10-27 RX ADMIN — Medication 500 MILLIGRAM(S): at 06:27

## 2020-10-27 NOTE — DISCHARGE NOTE NURSING/CASE MANAGEMENT/SOCIAL WORK - NSDCPNINST_GEN_ALL_CORE
Pt. educated on russo care and changed to russo leg bag using the video , Maria M ID# [id number] . pt. signed back to  that he verbalized understanding and had no other questions. supplies given to patient.

## 2020-10-27 NOTE — PROGRESS NOTE ADULT - SUBJECTIVE AND OBJECTIVE BOX
Infectious Diseases progress note:    Subjective: NAD, afebrile    ROS:  CONSTITUTIONAL:  No fever, chills, rigors  CARDIOVASCULAR:  No chest pain or palpitations  RESPIRATORY:   No SOB, cough, dyspnea on exertion.  No wheezing  GASTROINTESTINAL:  No abd pain, N/V, diarrhea/constipation  EXTREMITIES:  No swelling or joint pain  GENITOURINARY:  No burning on urination, increased frequency or urgency.  No flank pain  NEUROLOGIC:  No HA, visual disturbances  SKIN: No rashes    Allergies    No Known Allergies    Intolerances        ANTIBIOTICS/RELEVANT:  antimicrobials  ciprofloxacin     Tablet 500 milliGRAM(s) Oral every 12 hours    immunologic:    OTHER:  acetaminophen   Tablet .. 650 milliGRAM(s) Oral every 6 hours PRN  ALBUTerol    90 MICROgram(s) HFA Inhaler 1 Puff(s) Inhalation every 6 hours PRN  tamsulosin 0.4 milliGRAM(s) Oral at bedtime      Objective:  Vital Signs Last 24 Hrs  T(C): 36.6 (27 Oct 2020 11:45), Max: 36.8 (27 Oct 2020 05:07)  T(F): 97.9 (27 Oct 2020 11:45), Max: 98.2 (27 Oct 2020 05:07)  HR: 95 (27 Oct 2020 11:45) (89 - 95)  BP: 110/73 (27 Oct 2020 11:45) (110/73 - 116/79)  BP(mean): --  RR: 18 (27 Oct 2020 11:45) (18 - 18)  SpO2: 95% (27 Oct 2020 11:45) (95% - 96%)    PHYSICAL EXAM:  Constitutional:NAD  Eyes:BERKLEY, EOMI  Ear/Nose/Throat: no thrush, mucositis.  Moist mucous membranes	  Neck:no JVD, no lymphadenopathy, supple  Respiratory: CTA lilliam  Cardiovascular: S1S2 RRR, no murmurs  Gastrointestinal:soft, nontender,  nondistended (+) BS  Extremities:no e/e/c  Skin:  no rashes, open wounds or ulcerations  :  Fuller draining clear yellow urine        LABS:                        14.4   10.41 )-----------( 266      ( 27 Oct 2020 07:19 )             43.1     10-27    137  |  101  |  13  ----------------------------<  103<H>  4.1   |  23  |  0.63    Ca    9.1      27 Oct 2020 07:17    TPro  7.1  /  Alb  3.8  /  TBili  0.7  /  DBili  x   /  AST  30  /  ALT  58<H>  /  AlkPhos  41  10-27            Rapid RVP Result: [facility]          MICROBIOLOGY:  Culture - Urine (10.24.20 @ 00:56)   Specimen Source: .Urine Clean Catch (Midstream)   Culture Results:   No growth         RADIOLOGY & ADDITIONAL STUDIES:

## 2020-10-27 NOTE — PROGRESS NOTE ADULT - ASSESSMENT
45M hx urinary retention presents with Fever  x  3  days,  s/p  F/C  insertion,  negative  COVID, patient  is  on  bactrim post cystoscopy. intermittently had hematuria post procedure. Patient denies chest pain, SOB, cough, abd pain, N/V/D/C, weakness, HA.   As per out pt urologist: They did urocuff procedure in Aug 2020 to look at his prostate size which was enlarged so on 10/19/20 - cystoscopy + US via rectum to ddx urinary retention and enlarged prostate. since then pt has been spiking temp.  started on bactrim. Came to ER today for fever and hematuria  (23 Oct 2020 18:34)    ER vitals:  Tmax 102.1, P 122, BP 94/66.  No leukocytosis.  UA (+) nitrites.  RVP and SARS-Cov-2 (-).  Cxr clear lungs, KUB xray (-) for foreign body correlating to stent.   Pt given vanco/zosyn in ER, now on rocephin.  ID consulted for further abx managment.      Pt's wife available for sign language translation.     Sepsis/UTI:    - Pt with fever, tachycardia, low BP.  s/p recent urological procedure, h/o enlarged prostate and urinary retention.  Had been on Bactrim as outpt.  Pt p/w dysuria, increased urinary frequency, urinary retention and hematuria.  s/p Russo placement.    - Pt s/p vanco/zosyn/rocephin.  Cont to have high fever.  Will broaden to meropenem.  f/u ucx, blood cx    - Monitor Russo output.  Recommend URology f/u (pt follows with Dr. Papito Marley)    -  CTap with IV contrast w/o pyelo, prostate abscess, obstruction.  (+) bladder wall thickening c/w acute cystitis.     BPH:    - Cont russo for now.      - Cont tamsulosin.    - Urology f/u noted.    * culture data negative.  Checked outpt urine cx (10/23) also reported as no growth.  Likely because pt was already on abx (bactrim) and may have masked results. Pt improving on  PO cipro 500mg bid, ok to d/c pt on 7 day course of cipro.         Shona Bruno  [phone]

## 2020-10-27 NOTE — PROGRESS NOTE ADULT - REASON FOR ADMISSION
The patient is a 45y Male complaining of fever.

## 2020-10-27 NOTE — DISCHARGE NOTE NURSING/CASE MANAGEMENT/SOCIAL WORK - PATIENT PORTAL LINK FT
You can access the FollowMyHealth Patient Portal offered by [facility] by registering at the following website: [email or web address]. By joining [facility]’s FollowMyHealth portal, you will also be able to view your health information using other applications (apps) compatible with our system.

## 2020-10-28 LAB
CULTURE RESULTS: SIGNIFICANT CHANGE UP
CULTURE RESULTS: SIGNIFICANT CHANGE UP
SPECIMEN SOURCE: SIGNIFICANT CHANGE UP
SPECIMEN SOURCE: SIGNIFICANT CHANGE UP

## 2020-12-14 ENCOUNTER — APPOINTMENT (OUTPATIENT)
Dept: UROLOGY | Facility: CLINIC | Age: 45
End: 2020-12-14
Payer: COMMERCIAL

## 2020-12-14 VITALS
HEART RATE: 97 BPM | SYSTOLIC BLOOD PRESSURE: 120 MMHG | DIASTOLIC BLOOD PRESSURE: 81 MMHG | TEMPERATURE: 97.6 F | BODY MASS INDEX: 25.76 KG/M2 | WEIGHT: 170 LBS | HEIGHT: 68 IN

## 2020-12-14 DIAGNOSIS — Z80.42 FAMILY HISTORY OF MALIGNANT NEOPLASM OF PROSTATE: ICD-10-CM

## 2020-12-14 DIAGNOSIS — Z78.9 OTHER SPECIFIED HEALTH STATUS: ICD-10-CM

## 2020-12-14 PROCEDURE — 51798 US URINE CAPACITY MEASURE: CPT

## 2020-12-14 PROCEDURE — 99204 OFFICE O/P NEW MOD 45 MIN: CPT | Mod: 25

## 2020-12-14 PROCEDURE — 99072 ADDL SUPL MATRL&STAF TM PHE: CPT

## 2020-12-14 RX ORDER — FLUCONAZOLE 100 MG/1
100 TABLET ORAL
Qty: 6 | Refills: 0 | Status: ACTIVE | COMMUNITY
Start: 2020-11-13

## 2020-12-14 RX ORDER — SULFAMETHOXAZOLE AND TRIMETHOPRIM 800; 160 MG/1; MG/1
800-160 TABLET ORAL
Qty: 14 | Refills: 0 | Status: ACTIVE | COMMUNITY
Start: 2020-10-20

## 2020-12-14 RX ORDER — CIPROFLOXACIN HYDROCHLORIDE 500 MG/1
500 TABLET, FILM COATED ORAL
Qty: 12 | Refills: 0 | Status: ACTIVE | COMMUNITY
Start: 2020-10-27

## 2020-12-14 RX ORDER — CEFDINIR 300 MG/1
300 CAPSULE ORAL
Qty: 14 | Refills: 0 | Status: ACTIVE | COMMUNITY
Start: 2020-10-23

## 2020-12-14 RX ORDER — METHENAMINE HIPPURATE 1 G/1
1 TABLET ORAL
Qty: 180 | Refills: 3 | Status: ACTIVE | COMMUNITY
Start: 2020-12-14 | End: 1900-01-01

## 2020-12-14 RX ORDER — PHENAZOPYRIDINE HYDROCHLORIDE 200 MG/1
200 TABLET ORAL
Qty: 15 | Refills: 0 | Status: ACTIVE | COMMUNITY
Start: 2020-10-20

## 2020-12-14 NOTE — REVIEW OF SYSTEMS
[see HPI] : see HPI [Urine Infection (bladder/kidney)] : bladder/kidney infection [Pain during urination] : pain during urination [Told you have blood in urine on a urine test] : told blood was present in a urine test [Urine retention] : urine retention [Wake up at night to urinate  How many times?  ___] : wakes up to urinate [unfilled] times during the night [Bladder pressure] : experiences bladder pressure [Negative] : Heme/Lymph

## 2020-12-14 NOTE — ADDENDUM
[FreeTextEntry1] : Entered by Pietro Canales, acting as scribe for Dr. Eris Kelley.\par \par The documentation recorded by the scribe accurately reflects the service I personally performed and the decisions made by me.\par

## 2020-12-14 NOTE — LETTER BODY
[FreeTextEntry1] : Savage Zapata, DO\par [address]\[place] [address]\par [phone]\par \par Dear Dr. Zapata,\par \par Reason for Visit: BPH. Recurrent UTI.\par \par This is a 45 year-old hearing-impaired working gentleman with BPH and recurrent UTI. Patient is here today for evaluation. He uses an . The patient previously developed urosepsis following cystoscopy with an outside urologist. Patient reports he has weak uroflow, frequency, and hesitancy He denies any hematuria or urinary incontinence. He is currently taking Flomax BID regularly as directed. He reports no pain. All other review of systems are negative. He has prostate cancer in his family medical history through his father. He has no previous surgical history. Past medical history, family history and social history were inquired and were noncontributory to current condition. The patient does not use tobacco or drink alcohol. Medications and allergies were reviewed. He has no known allergies to medication. \par \par On examination, the patient is a healthy-appearing gentleman in no acute distress. He is alert and oriented follows commands. He has normal mood and affect. He is normocephalic. Neck is supple. Oral no thrush Respirations are unlabored. His abdomen is soft and nontender. Bladder is nonpalpable. No CVA tenderness. Neurologically he is grossly intact. No peripheral edema. Skin without gross abnormality. He has normal male external genitalia. Normal meatus. Bilateral testes are descended intrascrotally and normal to palpation. On rectal examination, there is normal sphincter tone. The prostate is clinically benign without focal induration or nodularity.\par \par Post-void residual on bladder scan today was 360 cc.\par \par ASSESSMENT: BPH. Recurrent UTI. Urinary retention.\par \par I counseled the patient on the various etiology of his symptoms. His PVR today was 360 cc. I discussed the natural history of BPH and the treatment options available. I discussed the options of conservative management with fluid in dietary restrictions, herbal therapy, medical therapy, and minimally invasive procedures. Risk and benefits were discussed. I answered his questions. I recommended he continue taking Flomax BID regularly as directed. I also recommended the patient begin a trial of Proscar. If his condition does not improve with medical therapy, then I discussed with the patient that he may consider surgical intervention. In terms of his recurrent UTI, I recommended the patient begin a trial of prophylactic Methenamine hippurate and Vitamin C to prevent infection. I discussed the potential side effects of the medications. I counseled the patient on its use and side effects. If the patient develops any side effects, the patient will discontinue the medications and contact me. He will obtain urinalysis and urine culture today to evaluate for infection. The patient will also obtain chlamydia/GC amplification and ureaplasma/mycoplasma genital culture for further evaluation. Risks and alternatives were discussed. I answered the patient questions. The patient will follow-up as directed and will contact me with any questions or concerns. Thank you for the opportunity to participate in the care of Mr. SANTOS. I will keep you updated on his progress.\par \par \par Plan: Continue Flomax BID. Trial of Proscar, Methenamine hippurate, and Vitamin C. Urinalysis. Culture. Ureaplasma/mycoplasma. Chlamydia/GC amplification. Follow-up in 1 month.

## 2021-01-14 ENCOUNTER — APPOINTMENT (OUTPATIENT)
Dept: UROLOGY | Facility: CLINIC | Age: 46
End: 2021-01-14
Payer: COMMERCIAL

## 2021-01-14 PROCEDURE — 51798 US URINE CAPACITY MEASURE: CPT

## 2021-01-14 PROCEDURE — 99214 OFFICE O/P EST MOD 30 MIN: CPT | Mod: 25

## 2021-01-14 PROCEDURE — 99072 ADDL SUPL MATRL&STAF TM PHE: CPT

## 2021-01-15 NOTE — LETTER BODY
[FreeTextEntry1] : Savage Zapata, DO\par [address]\[place] [address]\par [phone]\par \par Dear Dr. Zapata,\par \par Reason for Visit: BPH. Urinary retention. Recurrent UTI.\par \par This is a 45 year-old hearing-impaired working gentleman with urinary retention, BPH and recurrent UTI. He previously developed urosepsis following cystoscopy with an outside urologist. Patient returns today for follow-up, accompanied by . Since his last visit, the patient reports taking Flomax BID and Proscar regularly without any side effects or difficulties with the medication. He reports of improved urinary symptoms with medical therapy. The patient also notes taking Methenamine hippurate and Vitamin C regularly as directed. He denies any hematuria or urinary incontinence. He denies any pain. All other review of systems are negative. Past medical history, family history and social history were unchanged. Medications and allergies were reviewed. He has no known allergies to medication. \par \par On examination, the patient is a healthy-appearing gentleman in no acute distress. He is alert and oriented follows commands. He has normal mood and affect. He is normocephalic. Neck is supple. Oral no thrush Respirations are unlabored. His abdomen is soft and nontender. Bladder is nonpalpable. No CVA tenderness. Neurologically he is grossly intact. No peripheral edema. Skin without gross abnormality. He has normal male external genitalia. Normal meatus. Bilateral testes are descended intrascrotally and normal to palpation. On rectal examination, there is normal sphincter tone. The prostate is clinically benign without focal induration or nodularity.\par \par Post-void residual on bladder scan today was 450 cc. His previous PVR was 360 cc.\par \par ASSESSMENT: BPH. Urinary retention. Recurrent UTI.\par \par I counseled the patient. His PVR today was 450 cc. Given his persistent urinary retention, I recommended the patient undergo urodynamics testing with cystoscopy to evaluate his urinary outlet. I counseled the patient regarding the procedure. The risks and benefits were discussed. Alternatives were given. I answered the patient questions. The patient will proceed with the procedure. He will also obtain renal ultrasound for further evaluation. I recommended he continue taking Flomax BID and Proscar regularly as directed. In terms of his recurrent UTI, I recommended the patient continue taking prophylactic Methenamine hippurate and Vitamin C to prevent infection. He will obtain BMP and PSA to establish baselines. The patient will also obtain chlamydia/GC amplification, ureaplasma/mycoplasma genital culture, urinalysis, and urine culture for further evaluation. Risks and alternatives were discussed. I answered the patient questions. The patient will follow-up as directed and will contact me with any questions or concerns. Thank you for the opportunity to participate in the care of Mr. SANTOS. I will keep you updated on his progress.\par \par Plan: Continue Flomax BID, Proscar, Methenamine hippurate, and Vitamin C. Urinalysis. Urine culture. Ureaplasma/mycoplasma. Chlamydia/GC amplification. BMP. PSA. Renal ultrasound. Urodynamics testing. Cystoscopy. Follow-up as directed.

## 2021-01-16 LAB
ANION GAP SERPL CALC-SCNC: 15 MMOL/L
APPEARANCE: CLEAR
BACTERIA UR CULT: NORMAL
BACTERIA: NEGATIVE
BILIRUBIN URINE: NEGATIVE
BLOOD URINE: NEGATIVE
BUN SERPL-MCNC: 10 MG/DL
C TRACH RRNA SPEC QL NAA+PROBE: NOT DETECTED
CALCIUM SERPL-MCNC: 9.2 MG/DL
CHLORIDE SERPL-SCNC: 101 MMOL/L
CO2 SERPL-SCNC: 22 MMOL/L
COLOR: COLORLESS
CREAT SERPL-MCNC: 0.9 MG/DL
GLUCOSE QUALITATIVE U: NEGATIVE
GLUCOSE SERPL-MCNC: 91 MG/DL
HYALINE CASTS: 0 /LPF
KETONES URINE: NEGATIVE
LEUKOCYTE ESTERASE URINE: ABNORMAL
MICROSCOPIC-UA: NORMAL
N GONORRHOEA RRNA SPEC QL NAA+PROBE: NOT DETECTED
NITRITE URINE: NEGATIVE
PH URINE: 6
POTASSIUM SERPL-SCNC: 4.1 MMOL/L
PROTEIN URINE: NEGATIVE
PSA FREE FLD-MCNC: 6 %
PSA FREE SERPL-MCNC: 0.09 NG/ML
PSA SERPL-MCNC: 1.58 NG/ML
RED BLOOD CELLS URINE: 1 /HPF
SODIUM SERPL-SCNC: 138 MMOL/L
SOURCE AMPLIFICATION: NORMAL
SPECIFIC GRAVITY URINE: 1.01
SQUAMOUS EPITHELIAL CELLS: 1 /HPF
UROBILINOGEN URINE: NORMAL
WHITE BLOOD CELLS URINE: 13 /HPF

## 2021-01-28 ENCOUNTER — APPOINTMENT (OUTPATIENT)
Dept: UROLOGY | Facility: CLINIC | Age: 46
End: 2021-01-28

## 2021-02-22 ENCOUNTER — APPOINTMENT (OUTPATIENT)
Dept: UROLOGY | Facility: CLINIC | Age: 46
End: 2021-02-22
Payer: COMMERCIAL

## 2021-02-22 ENCOUNTER — OUTPATIENT (OUTPATIENT)
Dept: OUTPATIENT SERVICES | Facility: HOSPITAL | Age: 46
LOS: 1 days | End: 2021-02-22
Payer: COMMERCIAL

## 2021-02-22 VITALS
DIASTOLIC BLOOD PRESSURE: 87 MMHG | OXYGEN SATURATION: 99 % | TEMPERATURE: 98 F | HEART RATE: 109 BPM | SYSTOLIC BLOOD PRESSURE: 128 MMHG

## 2021-02-22 DIAGNOSIS — H91.90 UNSPECIFIED HEARING LOSS, UNSPECIFIED EAR: ICD-10-CM

## 2021-02-22 DIAGNOSIS — Z00.00 ENCOUNTER FOR GENERAL ADULT MEDICAL EXAMINATION W/OUT ABNORMAL FINDINGS: ICD-10-CM

## 2021-02-22 DIAGNOSIS — R35.0 FREQUENCY OF MICTURITION: ICD-10-CM

## 2021-02-22 PROCEDURE — 52000 CYSTOURETHROSCOPY: CPT

## 2021-02-22 PROCEDURE — 51784 ANAL/URINARY MUSCLE STUDY: CPT | Mod: 26

## 2021-02-22 PROCEDURE — 51728 CYSTOMETROGRAM W/VP: CPT | Mod: 26

## 2021-02-22 PROCEDURE — 99072 ADDL SUPL MATRL&STAF TM PHE: CPT

## 2021-02-22 PROCEDURE — 51797 INTRAABDOMINAL PRESSURE TEST: CPT

## 2021-02-22 PROCEDURE — 51797 INTRAABDOMINAL PRESSURE TEST: CPT | Mod: 26

## 2021-02-22 PROCEDURE — 51741 ELECTRO-UROFLOWMETRY FIRST: CPT

## 2021-02-22 PROCEDURE — 51728 CYSTOMETROGRAM W/VP: CPT

## 2021-02-22 PROCEDURE — 51741 ELECTRO-UROFLOWMETRY FIRST: CPT | Mod: 26

## 2021-02-22 PROCEDURE — 51798 US URINE CAPACITY MEASURE: CPT

## 2021-02-22 PROCEDURE — 99213 OFFICE O/P EST LOW 20 MIN: CPT | Mod: 25

## 2021-02-22 PROCEDURE — 51784 ANAL/URINARY MUSCLE STUDY: CPT

## 2021-02-22 RX ORDER — METHENAMINE HIPPURATE 1 G/1
1 TABLET ORAL
Qty: 180 | Refills: 3 | Status: ACTIVE | COMMUNITY
Start: 2020-12-14 | End: 1900-01-01

## 2021-02-22 RX ORDER — TAMSULOSIN HYDROCHLORIDE 0.4 MG/1
0.4 CAPSULE ORAL
Qty: 180 | Refills: 3 | Status: ACTIVE | COMMUNITY
Start: 2020-11-09 | End: 1900-01-01

## 2021-02-22 RX ORDER — FINASTERIDE 5 MG/1
5 TABLET, FILM COATED ORAL DAILY
Qty: 90 | Refills: 3 | Status: ACTIVE | COMMUNITY
Start: 2020-12-14 | End: 1900-01-01

## 2021-02-23 PROBLEM — H91.90 HEARING IMPAIRED: Status: ACTIVE | Noted: 2020-12-14

## 2021-02-23 LAB
APPEARANCE: CLEAR
BACTERIA UR CULT: NORMAL
BACTERIA: NEGATIVE
BILIRUBIN URINE: NEGATIVE
BLOOD URINE: ABNORMAL
COLOR: COLORLESS
GLUCOSE QUALITATIVE U: NEGATIVE
HYALINE CASTS: 0 /LPF
KETONES URINE: NEGATIVE
LEUKOCYTE ESTERASE URINE: NEGATIVE
MICROSCOPIC-UA: NORMAL
NITRITE URINE: NEGATIVE
PH URINE: 6
PROTEIN URINE: NEGATIVE
RED BLOOD CELLS URINE: 128 /HPF
SPECIFIC GRAVITY URINE: 1.01
SQUAMOUS EPITHELIAL CELLS: 1 /HPF
UROBILINOGEN URINE: NORMAL
WHITE BLOOD CELLS URINE: 1 /HPF

## 2021-02-25 DIAGNOSIS — N39.0 URINARY TRACT INFECTION, SITE NOT SPECIFIED: ICD-10-CM

## 2021-02-25 DIAGNOSIS — H91.90 UNSPECIFIED HEARING LOSS, UNSPECIFIED EAR: ICD-10-CM

## 2021-02-25 DIAGNOSIS — N40.1 BENIGN PROSTATIC HYPERPLASIA WITH LOWER URINARY TRACT SYMPTOMS: ICD-10-CM

## 2021-03-10 ENCOUNTER — OUTPATIENT (OUTPATIENT)
Dept: OUTPATIENT SERVICES | Facility: HOSPITAL | Age: 46
LOS: 1 days | End: 2021-03-10
Payer: COMMERCIAL

## 2021-03-10 VITALS
DIASTOLIC BLOOD PRESSURE: 78 MMHG | HEART RATE: 80 BPM | RESPIRATION RATE: 18 BRPM | OXYGEN SATURATION: 98 % | WEIGHT: 171.96 LBS | TEMPERATURE: 97 F | HEIGHT: 67 IN | SYSTOLIC BLOOD PRESSURE: 113 MMHG

## 2021-03-10 DIAGNOSIS — H91.90 UNSPECIFIED HEARING LOSS, UNSPECIFIED EAR: ICD-10-CM

## 2021-03-10 DIAGNOSIS — N40.1 BENIGN PROSTATIC HYPERPLASIA WITH LOWER URINARY TRACT SYMPTOMS: ICD-10-CM

## 2021-03-10 DIAGNOSIS — Z78.9 OTHER SPECIFIED HEALTH STATUS: ICD-10-CM

## 2021-03-10 DIAGNOSIS — Z86.69 PERSONAL HISTORY OF OTHER DISEASES OF THE NERVOUS SYSTEM AND SENSE ORGANS: ICD-10-CM

## 2021-03-10 DIAGNOSIS — Z01.818 ENCOUNTER FOR OTHER PREPROCEDURAL EXAMINATION: ICD-10-CM

## 2021-03-10 DIAGNOSIS — Z00.00 ENCOUNTER FOR GENERAL ADULT MEDICAL EXAMINATION WITHOUT ABNORMAL FINDINGS: ICD-10-CM

## 2021-03-10 DIAGNOSIS — N39.0 URINARY TRACT INFECTION, SITE NOT SPECIFIED: ICD-10-CM

## 2021-03-10 DIAGNOSIS — Z98.890 OTHER SPECIFIED POSTPROCEDURAL STATES: Chronic | ICD-10-CM

## 2021-03-10 LAB
ANION GAP SERPL CALC-SCNC: 11 MMOL/L — SIGNIFICANT CHANGE UP (ref 5–17)
BLD GP AB SCN SERPL QL: NEGATIVE — SIGNIFICANT CHANGE UP
BUN SERPL-MCNC: 15 MG/DL — SIGNIFICANT CHANGE UP (ref 7–23)
CALCIUM SERPL-MCNC: 9.5 MG/DL — SIGNIFICANT CHANGE UP (ref 8.4–10.5)
CHLORIDE SERPL-SCNC: 101 MMOL/L — SIGNIFICANT CHANGE UP (ref 96–108)
CO2 SERPL-SCNC: 27 MMOL/L — SIGNIFICANT CHANGE UP (ref 22–31)
CREAT SERPL-MCNC: 0.82 MG/DL — SIGNIFICANT CHANGE UP (ref 0.5–1.3)
GLUCOSE SERPL-MCNC: 94 MG/DL — SIGNIFICANT CHANGE UP (ref 70–99)
HCT VFR BLD CALC: 46.9 % — SIGNIFICANT CHANGE UP (ref 39–50)
HGB BLD-MCNC: 15.7 G/DL — SIGNIFICANT CHANGE UP (ref 13–17)
MCHC RBC-ENTMCNC: 27.4 PG — SIGNIFICANT CHANGE UP (ref 27–34)
MCHC RBC-ENTMCNC: 33.5 GM/DL — SIGNIFICANT CHANGE UP (ref 32–36)
MCV RBC AUTO: 82 FL — SIGNIFICANT CHANGE UP (ref 80–100)
NRBC # BLD: 0 /100 WBCS — SIGNIFICANT CHANGE UP (ref 0–0)
PLATELET # BLD AUTO: 263 K/UL — SIGNIFICANT CHANGE UP (ref 150–400)
POTASSIUM SERPL-MCNC: 3.7 MMOL/L — SIGNIFICANT CHANGE UP (ref 3.5–5.3)
POTASSIUM SERPL-SCNC: 3.7 MMOL/L — SIGNIFICANT CHANGE UP (ref 3.5–5.3)
RBC # BLD: 5.72 M/UL — SIGNIFICANT CHANGE UP (ref 4.2–5.8)
RBC # FLD: 12.5 % — SIGNIFICANT CHANGE UP (ref 10.3–14.5)
RH IG SCN BLD-IMP: POSITIVE — SIGNIFICANT CHANGE UP
SODIUM SERPL-SCNC: 139 MMOL/L — SIGNIFICANT CHANGE UP (ref 135–145)
WBC # BLD: 7.56 K/UL — SIGNIFICANT CHANGE UP (ref 3.8–10.5)
WBC # FLD AUTO: 7.56 K/UL — SIGNIFICANT CHANGE UP (ref 3.8–10.5)

## 2021-03-10 PROCEDURE — 85027 COMPLETE CBC AUTOMATED: CPT

## 2021-03-10 PROCEDURE — 86900 BLOOD TYPING SEROLOGIC ABO: CPT

## 2021-03-10 PROCEDURE — 86850 RBC ANTIBODY SCREEN: CPT

## 2021-03-10 PROCEDURE — 80048 BASIC METABOLIC PNL TOTAL CA: CPT

## 2021-03-10 PROCEDURE — G0463: CPT

## 2021-03-10 PROCEDURE — 86901 BLOOD TYPING SEROLOGIC RH(D): CPT

## 2021-03-10 RX ORDER — IBUPROFEN 200 MG
3 TABLET ORAL
Qty: 0 | Refills: 0 | DISCHARGE

## 2021-03-10 RX ORDER — ACETAMINOPHEN 500 MG
2 TABLET ORAL
Qty: 0 | Refills: 0 | DISCHARGE

## 2021-03-10 NOTE — H&P PST ADULT - NSICDXPROBLEM_GEN_ALL_CORE_FT
PROBLEM DIAGNOSES  Problem: Enlarged prostate with lower urinary tract symptoms (LUTS)  Assessment and Plan: Pt scheduled for sx on 3/24/21. Surgical instructions reviewed w/ pt and spouse. COVID testing planned on 3/21/21. As per pt med eval on 3/15/21.    Problem: History of deafness  Assessment and Plan: Pt w/ hearing aid to left ear w/ minimal hearing.    Problem: Patient is Faith  Assessment and Plan: Copy of forms in chart. Surgeon made aware.

## 2021-03-10 NOTE — H&P PST ADULT - NSICDXPASTMEDICALHX_GEN_ALL_CORE_FT
PAST MEDICAL HISTORY:  BPH (benign prostatic hyperplasia)     Enlarged prostate with lower urinary tract symptoms (LUTS)     History of deafness     History of seasonal allergies Pt w/ inhaler PRN (denies recent use)    Patient is Sikh

## 2021-03-10 NOTE — H&P PST ADULT - NSANTHOSAYNRD_GEN_A_CORE
No. ALPHONSO screening performed.  STOP BANG Legend: 0-2 = LOW Risk; 3-4 = INTERMEDIATE Risk; 5-8 = HIGH Risk

## 2021-03-10 NOTE — H&P PST ADULT - NSICDXFAMILYHX_GEN_ALL_CORE_FT
FAMILY HISTORY:  Family history of CABG, Father  Family hx of prostate cancer, Father  FHx: diabetes mellitus, Father

## 2021-03-10 NOTE — H&P PST ADULT - HISTORY OF PRESENT ILLNESS
46M Jehovah Witness w/ hx urinary retention presents to PST accompanied by spouse for a Greenlight Laser Vaporization of Prostate on 3/24/2021. 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 Denies recent fevers, chills, cough, chest pain or SOB and feels well otherwise. COVID testing scheduled at [facility] on 3/21/2021.

## 2021-03-16 PROBLEM — Z86.69 PERSONAL HISTORY OF OTHER DISEASES OF THE NERVOUS SYSTEM AND SENSE ORGANS: Chronic | Status: ACTIVE | Noted: 2021-03-10

## 2021-03-16 PROBLEM — N40.0 BENIGN PROSTATIC HYPERPLASIA WITHOUT LOWER URINARY TRACT SYMPTOMS: Chronic | Status: ACTIVE | Noted: 2021-03-10

## 2021-03-16 PROBLEM — Z78.9 OTHER SPECIFIED HEALTH STATUS: Chronic | Status: ACTIVE | Noted: 2021-03-10

## 2021-03-16 PROBLEM — N40.1 BENIGN PROSTATIC HYPERPLASIA WITH LOWER URINARY TRACT SYMPTOMS: Chronic | Status: ACTIVE | Noted: 2021-03-10

## 2021-03-16 PROBLEM — Z88.9 ALLERGY STATUS TO UNSPECIFIED DRUGS, MEDICAMENTS AND BIOLOGICAL SUBSTANCES: Chronic | Status: ACTIVE | Noted: 2021-03-10

## 2021-03-20 DIAGNOSIS — Z53.1 PROCEDURE AND TREATMENT NOT CARRIED OUT BECAUSE OF PATIENT'S DECISION FOR REASONS OF BELIEF AND GROUP PRESSURE: ICD-10-CM

## 2021-03-21 ENCOUNTER — OUTPATIENT (OUTPATIENT)
Dept: OUTPATIENT SERVICES | Facility: HOSPITAL | Age: 46
LOS: 1 days | End: 2021-03-21
Payer: COMMERCIAL

## 2021-03-21 DIAGNOSIS — Z11.52 ENCOUNTER FOR SCREENING FOR COVID-19: ICD-10-CM

## 2021-03-21 DIAGNOSIS — Z98.890 OTHER SPECIFIED POSTPROCEDURAL STATES: Chronic | ICD-10-CM

## 2021-03-21 LAB — SARS-COV-2 RNA SPEC QL NAA+PROBE: SIGNIFICANT CHANGE UP

## 2021-03-21 PROCEDURE — U0003: CPT

## 2021-03-21 PROCEDURE — U0005: CPT

## 2021-03-21 PROCEDURE — C9803: CPT

## 2021-03-23 ENCOUNTER — TRANSCRIPTION ENCOUNTER (OUTPATIENT)
Age: 46
End: 2021-03-23

## 2021-03-24 ENCOUNTER — OUTPATIENT (OUTPATIENT)
Dept: OUTPATIENT SERVICES | Facility: HOSPITAL | Age: 46
LOS: 1 days | End: 2021-03-24
Payer: COMMERCIAL

## 2021-03-24 ENCOUNTER — APPOINTMENT (OUTPATIENT)
Dept: UROLOGY | Facility: HOSPITAL | Age: 46
End: 2021-03-24

## 2021-03-24 VITALS
HEART RATE: 94 BPM | OXYGEN SATURATION: 100 % | TEMPERATURE: 98 F | DIASTOLIC BLOOD PRESSURE: 73 MMHG | SYSTOLIC BLOOD PRESSURE: 121 MMHG | RESPIRATION RATE: 16 BRPM

## 2021-03-24 VITALS
HEART RATE: 96 BPM | TEMPERATURE: 98 F | RESPIRATION RATE: 16 BRPM | OXYGEN SATURATION: 100 % | SYSTOLIC BLOOD PRESSURE: 133 MMHG | DIASTOLIC BLOOD PRESSURE: 84 MMHG | WEIGHT: 171.96 LBS | HEIGHT: 67 IN

## 2021-03-24 DIAGNOSIS — Z01.818 ENCOUNTER FOR OTHER PREPROCEDURAL EXAMINATION: ICD-10-CM

## 2021-03-24 DIAGNOSIS — Z98.890 OTHER SPECIFIED POSTPROCEDURAL STATES: Chronic | ICD-10-CM

## 2021-03-24 DIAGNOSIS — H91.90 UNSPECIFIED HEARING LOSS, UNSPECIFIED EAR: ICD-10-CM

## 2021-03-24 DIAGNOSIS — Z00.00 ENCOUNTER FOR GENERAL ADULT MEDICAL EXAMINATION WITHOUT ABNORMAL FINDINGS: ICD-10-CM

## 2021-03-24 DIAGNOSIS — N39.0 URINARY TRACT INFECTION, SITE NOT SPECIFIED: ICD-10-CM

## 2021-03-24 DIAGNOSIS — N40.1 BENIGN PROSTATIC HYPERPLASIA WITH LOWER URINARY TRACT SYMPTOMS: ICD-10-CM

## 2021-03-24 LAB — RH IG SCN BLD-IMP: POSITIVE — SIGNIFICANT CHANGE UP

## 2021-03-24 PROCEDURE — C1889: CPT

## 2021-03-24 PROCEDURE — 52648 LASER SURGERY OF PROSTATE: CPT

## 2021-03-24 RX ORDER — LIDOCAINE HCL 20 MG/ML
0.2 VIAL (ML) INJECTION ONCE
Refills: 0 | Status: DISCONTINUED | OUTPATIENT
Start: 2021-03-24 | End: 2021-03-24

## 2021-03-24 RX ORDER — SODIUM CHLORIDE 9 MG/ML
1000 INJECTION, SOLUTION INTRAVENOUS
Refills: 0 | Status: DISCONTINUED | OUTPATIENT
Start: 2021-03-24 | End: 2021-03-24

## 2021-03-24 RX ORDER — HYDROMORPHONE HYDROCHLORIDE 2 MG/ML
0.5 INJECTION INTRAMUSCULAR; INTRAVENOUS; SUBCUTANEOUS
Refills: 0 | Status: DISCONTINUED | OUTPATIENT
Start: 2021-03-24 | End: 2021-03-24

## 2021-03-24 RX ORDER — ALBUTEROL 90 UG/1
1 AEROSOL, METERED ORAL
Qty: 0 | Refills: 0 | DISCHARGE

## 2021-03-24 RX ORDER — CEPHALEXIN 500 MG
1 CAPSULE ORAL
Qty: 6 | Refills: 0
Start: 2021-03-24 | End: 2021-03-26

## 2021-03-24 RX ORDER — TAMSULOSIN HYDROCHLORIDE 0.4 MG/1
1 CAPSULE ORAL
Qty: 0 | Refills: 0 | DISCHARGE

## 2021-03-24 RX ORDER — SODIUM CHLORIDE 9 MG/ML
1000 INJECTION, SOLUTION INTRAVENOUS
Refills: 0 | Status: DISCONTINUED | OUTPATIENT
Start: 2021-03-24 | End: 2021-04-07

## 2021-03-24 RX ORDER — SODIUM CHLORIDE 9 MG/ML
3 INJECTION INTRAMUSCULAR; INTRAVENOUS; SUBCUTANEOUS EVERY 8 HOURS
Refills: 0 | Status: DISCONTINUED | OUTPATIENT
Start: 2021-03-24 | End: 2021-03-24

## 2021-03-24 RX ORDER — ONDANSETRON 8 MG/1
4 TABLET, FILM COATED ORAL ONCE
Refills: 0 | Status: DISCONTINUED | OUTPATIENT
Start: 2021-03-24 | End: 2021-03-24

## 2021-03-24 RX ORDER — METHENAMINE MANDELATE 1 G
1 TABLET ORAL
Qty: 0 | Refills: 0 | DISCHARGE

## 2021-03-24 RX ORDER — FINASTERIDE 5 MG/1
1 TABLET, FILM COATED ORAL
Qty: 0 | Refills: 0 | DISCHARGE

## 2021-03-24 NOTE — ASU DISCHARGE PLAN (ADULT/PEDIATRIC) - ASU DC SPECIAL INSTRUCTIONSFT
Please follow up with Dr. Kelley in tomorrow morning.   Call or follow up sooner with fevers, chills, nausea, vomiting, increasing pain, or with other concerns.

## 2021-03-25 ENCOUNTER — APPOINTMENT (OUTPATIENT)
Dept: UROLOGY | Facility: CLINIC | Age: 46
End: 2021-03-25
Payer: COMMERCIAL

## 2021-03-25 ENCOUNTER — OUTPATIENT (OUTPATIENT)
Dept: OUTPATIENT SERVICES | Facility: HOSPITAL | Age: 46
LOS: 1 days | End: 2021-03-25
Payer: COMMERCIAL

## 2021-03-25 DIAGNOSIS — Z98.890 OTHER SPECIFIED POSTPROCEDURAL STATES: Chronic | ICD-10-CM

## 2021-03-25 DIAGNOSIS — R35.0 FREQUENCY OF MICTURITION: ICD-10-CM

## 2021-03-25 PROCEDURE — 99024 POSTOP FOLLOW-UP VISIT: CPT

## 2021-03-25 PROCEDURE — 51700 IRRIGATION OF BLADDER: CPT | Mod: 58

## 2021-03-25 PROCEDURE — 51700 IRRIGATION OF BLADDER: CPT

## 2021-03-25 NOTE — LETTER BODY
[FreeTextEntry1] : Savage Zapata, DO\par [address]\[place] [address]\par [phone]\par \par Dear Dr. Zapata,\par \par Reason for Visit: Follow-up after GreenLight laser vaporization of prostate. BPH. Urinary retention. Recurrent UTI.\par \par This is a 46 year-old hearing-impaired working gentleman with urinary retention, BPH and recurrent UTI. He previously developed urosepsis following cystoscopy with an outside urologist. The patient returns today for a trial of void. Since his last visit, the patient underwent uneventful GreenLight laser vaporization of prostate yesterday. The patient reports taking Flomax BID and Proscar regularly without any side effects or difficulties. He also notes taking Methenamine hippurate and Vitamin C regularly as directed. He denies any hematuria or urinary incontinence. He denies any pain. All other review of systems are negative. Past medical history, family history and social history were unchanged. Medications and allergies were reviewed. He has no known allergies to medication. \par \par On examination, the patient is a healthy-appearing gentleman in no acute distress. He is alert and oriented follows commands. He has normal mood and affect. He is normocephalic. Neck is supple. Oral no thrush Respirations are unlabored. His abdomen is soft and nontender. Bladder is nonpalpable. No CVA tenderness. Neurologically he is grossly intact. No peripheral edema. Skin without gross abnormality. He has normal male external genitalia. Normal meatus. Bilateral testes are descended intrascrotally and normal to palpation. On rectal examination, there is normal sphincter tone. The prostate is clinically benign without focal induration or nodularity.\par \par His recent urine culture was negative.\par \par Patient was given a voiding trial today. Patient was able to void spontaneously. He had strong urine flow.\par \par ASSESSMENT: BPH and urinary retention, s/p GreenLight laser vaporization of prostate. Recurrent UTI.\par \par I counseled the patient. He passed his voiding trial today. I recommended the patient continue taking Flomax and Proscar regularly as directed. In terms of his recurrent UTI, I recommended he continue taking prophylactic Methenamine hippurate and Vitamin C regularly as directed to prevent infection. Risks and alternatives were discussed. I answered the patient questions. The patient will follow-up in 1 month and will contact me with any questions or concerns. Thank you for the opportunity to participate in the care of Mr. SANTOS. I will keep you updated on his progress.\par \par Plan: Continue Flomax, Proscar, Methenamine hippurate. and Vitamin C. Follow-up in 1 month.

## 2021-03-29 DIAGNOSIS — N40.1 BENIGN PROSTATIC HYPERPLASIA WITH LOWER URINARY TRACT SYMPTOMS: ICD-10-CM

## 2021-04-22 ENCOUNTER — APPOINTMENT (OUTPATIENT)
Dept: UROLOGY | Facility: CLINIC | Age: 46
End: 2021-04-22
Payer: COMMERCIAL

## 2021-04-22 PROCEDURE — 99024 POSTOP FOLLOW-UP VISIT: CPT

## 2021-04-22 NOTE — LETTER BODY
[FreeTextEntry1] : Savage Zapata, DO\par [address]\[place] [address]\par [phone]\par \par Dear Dr. Zapata,\par \par Reason for Visit: Follow-up after GreenLight laser vaporization of prostate. BPH. Previous urinary retention. Recurrent UTI.\par \par This is a 46 year-old hearing-impaired working gentleman with previous recurrent UTI, previous urinary retention and BPH status post GreenLight laser vaporization of prostate on March 24. He previously developed urosepsis following cystoscopy with an outside urologist. He passed his voiding trial last visit. Patient returns today for follow-up. Since he was last seen, the patient continues to take Flomax BID, Proscar, Methenamine hippurate, and Vitamin C regularly without any side effects or difficulties. The patient reports of strong urine flow. He offers no urinary complaints. The patient is doing well. He denies any hematuria or urinary incontinence. He denies any pain. All other review of systems are negative. Past medical history, family history and social history were unchanged. Medications and allergies were reviewed. He has no known allergies to medication. \par \par On examination, the patient is a healthy-appearing gentleman in no acute distress. He is alert and oriented follows commands. He has normal mood and affect. He is normocephalic. Neck is supple. Oral no thrush Respirations are unlabored. His abdomen is soft and nontender. Bladder is nonpalpable. No CVA tenderness. Neurologically he is grossly intact. No peripheral edema. Skin without gross abnormality. He has normal male external genitalia. Normal meatus. Bilateral testes are descended intrascrotally and normal to palpation. On rectal examination, there is normal sphincter tone. The prostate is clinically benign without focal induration or nodularity.\par \par Post-void residual on bladder scan today was 14 cc.\par \par ASSESSMENT: BPH, s/p GreenLight laser vaporization of prostate. Urinary retention, resolved. Recurrent UTI.\par \par I counseled the patient. He is doing well. His PVR today was 14 cc. In terms of his BPH, he has strong urine flow. I recommended he continue taking Flomax and Proscar. In terms of his recurrent UTI, I recommended the patient continue taking prophylactic Methenamine hippurate and Vitamin C to prevent infection. Risks and alternatives were discussed. I answered the patient questions. The patient will follow-up in 6 months and will contact me with any questions or concerns. Thank you for the opportunity to participate in the care of Mr. SATNOS. I will keep you updated on his progress.\par \par Plan: Continue Flomax, Proscar, Methenamine hippurate. and Vitamin C. Follow-up in 6 months.

## 2021-10-06 PROBLEM — Z53.1 REFUSAL OF BLOOD TRANSFUSIONS AS PATIENT IS JEHOVAH'S WITNESS: Status: ACTIVE | Noted: 2021-03-20

## 2021-10-28 ENCOUNTER — APPOINTMENT (OUTPATIENT)
Dept: UROLOGY | Facility: CLINIC | Age: 46
End: 2021-10-28

## 2022-01-27 ENCOUNTER — APPOINTMENT (OUTPATIENT)
Dept: UROLOGY | Facility: CLINIC | Age: 47
End: 2022-01-27

## 2022-01-27 DIAGNOSIS — N13.8 BENIGN PROSTATIC HYPERPLASIA WITH LOWER URINARY TRACT SYMPMS: ICD-10-CM

## 2022-01-27 DIAGNOSIS — N40.1 BENIGN PROSTATIC HYPERPLASIA WITH LOWER URINARY TRACT SYMPMS: ICD-10-CM

## 2022-01-27 DIAGNOSIS — N39.0 URINARY TRACT INFECTION, SITE NOT SPECIFIED: ICD-10-CM

## 2024-12-23 NOTE — PROVIDER CONTACT NOTE (OTHER) - ACTION/TREATMENT ORDERED:
Blood Pressure Report    Results:   sent portal message.  Patient not at home     tylenol IV ordered/given yes